# Patient Record
Sex: FEMALE | Race: AMERICAN INDIAN OR ALASKA NATIVE
[De-identification: names, ages, dates, MRNs, and addresses within clinical notes are randomized per-mention and may not be internally consistent; named-entity substitution may affect disease eponyms.]

---

## 2018-10-28 ENCOUNTER — HOSPITAL ENCOUNTER (INPATIENT)
Dept: HOSPITAL 42 - ED | Age: 63
LOS: 5 days | Discharge: HOME | DRG: 885 | End: 2018-11-02
Attending: PSYCHIATRY & NEUROLOGY | Admitting: PSYCHIATRY & NEUROLOGY
Payer: MEDICARE

## 2018-10-28 VITALS — BODY MASS INDEX: 36.9 KG/M2

## 2018-10-28 VITALS — OXYGEN SATURATION: 100 %

## 2018-10-28 DIAGNOSIS — S81.851A: ICD-10-CM

## 2018-10-28 DIAGNOSIS — F31.9: Primary | ICD-10-CM

## 2018-10-28 DIAGNOSIS — R07.89: ICD-10-CM

## 2018-10-28 DIAGNOSIS — I25.10: ICD-10-CM

## 2018-10-28 DIAGNOSIS — L03.115: ICD-10-CM

## 2018-10-28 DIAGNOSIS — I45.81: ICD-10-CM

## 2018-10-28 DIAGNOSIS — F42.9: ICD-10-CM

## 2018-10-28 DIAGNOSIS — E66.9: ICD-10-CM

## 2018-10-28 DIAGNOSIS — E78.00: ICD-10-CM

## 2018-10-28 DIAGNOSIS — S81.852A: ICD-10-CM

## 2018-10-28 DIAGNOSIS — E03.9: ICD-10-CM

## 2018-10-28 DIAGNOSIS — Z98.84: ICD-10-CM
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
## 2018-10-28 DIAGNOSIS — I10: ICD-10-CM

## 2018-10-28 DIAGNOSIS — F43.22: ICD-10-CM

## 2018-10-28 LAB
ALBUMIN SERPL-MCNC: 4.3 G/DL (ref 3–4.8)
ALBUMIN/GLOB SERPL: 1.5 {RATIO} (ref 1.1–1.8)
ALT SERPL-CCNC: 45 U/L (ref 7–56)
APAP SERPL-MCNC: < 10 UG/ML (ref 10–20)
APPEARANCE UR: CLEAR
AST SERPL-CCNC: 58 U/L (ref 14–36)
BACTERIA #/AREA URNS HPF: (no result) /[HPF]
BASOPHILS # BLD AUTO: 0.01 K/MM3 (ref 0–2)
BASOPHILS NFR BLD: 0.2 % (ref 0–3)
BILIRUB UR-MCNC: NEGATIVE MG/DL
BUN SERPL-MCNC: 18 MG/DL (ref 7–21)
CALCIUM SERPL-MCNC: 9.4 MG/DL (ref 8.4–10.5)
COLOR UR: YELLOW
EOSINOPHIL # BLD: 0.1 10*3/UL (ref 0–0.7)
EOSINOPHIL NFR BLD: 1.5 % (ref 1.5–5)
ERYTHROCYTE [DISTWIDTH] IN BLOOD BY AUTOMATED COUNT: 13.8 % (ref 11.5–14.5)
GFR NON-AFRICAN AMERICAN: 56
GLUCOSE UR STRIP-MCNC: NEGATIVE MG/DL
GRANULOCYTES # BLD: 2.35 10*3/UL (ref 1.4–6.5)
GRANULOCYTES NFR BLD: 51.5 % (ref 50–68)
HDLC SERPL-MCNC: 76 MG/DL (ref 29–60)
HGB BLD-MCNC: 13.2 G/DL (ref 12–16)
LDLC SERPL-MCNC: 61 MG/DL (ref 0–129)
LEUKOCYTE ESTERASE UR-ACNC: (no result) LEU/UL
LYMPHOCYTES # BLD: 1.9 10*3/UL (ref 1.2–3.4)
LYMPHOCYTES NFR BLD AUTO: 42.2 % (ref 22–35)
MCH RBC QN AUTO: 28 PG (ref 25–35)
MCHC RBC AUTO-ENTMCNC: 33.3 G/DL (ref 31–37)
MCV RBC AUTO: 83.9 FL (ref 80–105)
MONOCYTES # BLD AUTO: 0.2 10*3/UL (ref 0.1–0.6)
MONOCYTES NFR BLD: 4.6 % (ref 1–6)
PH UR STRIP: 6 [PH] (ref 4.7–8)
PLATELET # BLD: 191 10^3/UL (ref 120–450)
PMV BLD AUTO: 9.3 FL (ref 7–11)
PROT UR STRIP-MCNC: NEGATIVE MG/DL
RBC # BLD AUTO: 4.72 10^6/UL (ref 3.5–6.1)
RBC # UR STRIP: NEGATIVE /UL
RBC #/AREA URNS HPF: NEGATIVE /HPF (ref 0–2)
SALICYLATES SERPL-MCNC: < 1 MG/DL (ref 2–20)
SP GR UR STRIP: 1.02 (ref 1–1.03)
UROBILINOGEN UR STRIP-ACNC: 0.2 E.U./DL
WBC # BLD AUTO: 4.6 10^3/UL (ref 4.5–11)

## 2018-10-28 NOTE — RAD
Date of service: 



10/28/2018



HISTORY:

 [facility] 



COMPARISON:

06/25/2016 



FINDINGS:



LUNGS:

The lungs are well inflated and clear.



PLEURA:

No pleural effusions or pneumothorax. 



CARDIOVASCULAR:

The heart is normal in size.  No aortic atherosclerotic calcification 

present. 



OSSEOUS STRUCTURES:

Within normal limits for the patient's age.



VISUALIZED UPPER ABDOMEN:

Normal.



OTHER FINDINGS:

None.



IMPRESSION:

No active pulmonary disease.

## 2018-10-28 NOTE — ED PDOC
Physical Exam


Vital Signs Reviewed: Yes





Vital Signs











  Temp Pulse Resp BP Pulse Ox


 


 10/28/18 17:30   90  18  120/80  99


 


 10/28/18 15:55  97.7 F  104 H  18  116/74  96











Temperature: Afebrile


Blood Pressure: Normal


Pulse: Tachycardic


Respiratory Rate: Normal


Appearance: Positive for: Well-Appearing, Non-Toxic, Comfortable


Pain Distress: None


Mental Status: Positive for: Alert and Oriented X 3





- Systems Exam


Head: Present: Atraumatic, Normocephalic


Pupils: Present: PERRL


Extroacular Muscles: Present: EOMI


Conjunctiva: Present: Normal


Mouth: Present: Moist Mucous Membranes


Neck: Present: Normal Range of Motion


Respiratory/Chest: Present: Clear to Auscultation, Good Air Exchange.  No: 

Respiratory Distress, Accessory Muscle Use


Cardiovascular: Present: Regular Rate and Rhythm, Normal S1, S2.  No: Murmurs


Abdomen: No: Tenderness, Distention, Peritoneal Signs


Back: Present: Normal Inspection


Upper Extremity: Present: Normal Inspection.  No: Cyanosis, Edema


Lower Extremity: Present: Normal Inspection.  No: Edema


Neurological: Present: GCS=15, CN II-XII Intact, Speech Normal


Skin: Present: Warm, Dry, Normal Color.  No: Rashes


Psychiatric: Present: Alert, Oriented x 3, Normal Insight, Normal Concentration,

Anxious (mildly anxious), Depressed Mood





Medical Decision Making


ED Course and Treatment: 


10/28/18 19:02


Case endorsed to me by Dr. Sullivan, pending urine and hospital admission.


10/28/18 19:13


Spoke to PES evaluator who states patient will be admitted under Dr. James and 

needs to confirm insurance. Urine pending. 








- Lab Interpretations


Lab Results: 











                                 10/28/18 16:45 





                                 10/28/18 16:45 





                                   Lab Results





10/28/18 16:45: Alcohol, Quantitative < 10


10/28/18 16:45: Salicylates < 1 L, Acetaminophen < 10.0 L


10/28/18 16:45: Sodium 140, Potassium 3.7, Chloride 104, Carbon Dioxide 26, 

Anion Gap 14, BUN 18, Creatinine 1.0, Est GFR (African Amer) > 60, Est GFR (Non-

Af Amer) 56, Random Glucose 107, Calcium 9.4, Magnesium 1.9, Total Bilirubin 

0.4, AST 58 H, ALT 45, Alkaline Phosphatase 73, Total Protein 7.3, Albumin 4.3, 

Globulin 2.9, Albumin/Globulin Ratio 1.5


10/28/18 16:45: WBC 4.6  D, RBC 4.72, Hgb 13.2, Hct 39.6, MCV 83.9, MCH 28.0, 

MCHC 33.3, RDW 13.8, Plt Count 191, MPV 9.3, Gran % 51.5, Lymph % (Auto) 42.2 H,

Mono % (Auto) 4.6, Eos % (Auto) 1.5, Baso % (Auto) 0.2, Gran # 2.35, Lymph # 

(Auto) 1.9, Mono # (Auto) 0.2, Eos # (Auto) 0.1, Baso # (Auto) 0.01











- RAD Interpretation


Radiology Orders: 











10/28/18 16:03


CHEST PORTABLE [RAD] Stat 














- Scribe Statement


The provider has reviewed the documentation as recorded by the Scribe


Rahel Brown





All medical record entries made by the Scribe were at my direction and 

personally dictated by me. I have reviewed the chart and agree that the record 

accurately reflects my personal performance of the history, physical exam, 

medical decision making, and the department course for this patient. I have also

 personally directed, reviewed, and agree with the discharge instructions and 

disposition.








Disposition/Present on Arrival





- Present on Arrival


Any Indicators Present on Arrival: No


History of DVT/PE: No


History of Uncontrolled Diabetes: No


Urinary Catheter: No


History of Decub. Ulcer: No


History Surgical Site Infection Following: None





- Disposition


Have Diagnosis and Disposition been Completed?: Yes


Diagnosis: 


 Depression





Disposition Time: 19:10


Patient Plan: Admission


Condition: STABLE


Referrals: 


Ramirez Stroud MD [Primary Care Provider] - Follow up with primary


Forms:  [employer] (English)
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
## 2018-10-29 LAB
HDLC SERPL-MCNC: 77 MG/DL (ref 29–60)
LDLC SERPL-MCNC: 65 MG/DL (ref 0–129)

## 2018-10-29 RX ADMIN — AMOXICILLIN AND CLAVULANATE POTASSIUM SCH TAB: 500; 125 TABLET, FILM COATED ORAL at 15:18

## 2018-10-29 RX ADMIN — AMOXICILLIN AND CLAVULANATE POTASSIUM SCH TAB: 500; 125 TABLET, FILM COATED ORAL at 11:11

## 2018-10-29 RX ADMIN — MUPIROCIN SCH APPLIC: 20 OINTMENT TOPICAL at 11:11

## 2018-10-29 RX ADMIN — OXYCODONE HYDROCHLORIDE AND ACETAMINOPHEN PRN TAB: 2.5; 325 TABLET ORAL at 15:18

## 2018-10-29 RX ADMIN — MUPIROCIN SCH APPLIC: 20 OINTMENT TOPICAL at 15:33
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
## 2018-10-29 NOTE — CARD
--------------- APPROVED REPORT --------------





Date of service: 10/29/2018



EKG Measurement

Heart [id number]

CT [id number]

[id number]

[id number]

[id number]



<Conclusion>

Normal sinus rhythm

Low voltage QRS

Possible Inferior infarct, age undetermined

Abnormal ECG

## 2018-10-29 NOTE — CARD
--------------- APPROVED REPORT --------------





Date of service: 10/28/2018



EKG Measurement

Heart [id number]

MT [id number]

[id number]

[id number]



<Conclusion>

Sinus rhythm with 1st degree AV block

Possible Inferior infarct, old

NSSTW chnages

Prolonged QT

## 2018-10-29 NOTE — PCM.BM
<Toby Briones O - Last Filed: 10/29/18 05:40>





Treatment Plan Problems





- Problems identified on initial assessmt


  ** Hopelessness


Date Initiated: 10/28/18


Time Initiated: 22:45


Assessment reference: NA


Status: Active





  ** Altered sleep pattern


Date Initiated: 10/28/18


Time Initiated: 22:00


Assessment reference: NA


Status: Active





  ** Feeling of worthlesness


Date Initiated: 10/28/18


Time Initiated: 23:00


Assessment reference: NA


Status: Active





Treatment assets and liabiliti


Patient Assests: ADL independent, good support system, good past tx response


Patient Liabilities: poor support system





- Milieu Protocol


Maintain good personal hygiene: daily Encourage regular showers, daily Remind 

patient to perform daily oral care, daily Assist patient to perform ADL's


Maintain personal safety: daily Educate patient to report safety concerns to 

staff, daily Monitor environment for contraband/sharps


Medication safety: Monitor for expected outcome, potential side effects: daily, 

Assess barriers to learning: daily, Assess readiness for medication education: 

daily





Family Contact


Family contact: Patient declines to allow family contact at present





Discharge/Continuing Care





- Education Needs


Education Needs: Patient Medication, Patient Coping Skills, Patient Aftercare 

Safety Plan





- Discharge


Discharge Criteria: Normal sleep pattern





<Erika Ghotra - Last Filed: 10/29/18 15:00>





- Diagnosis


(1) Bipolar disorder


Status: Acute   


Interventions: 





10/29/18 15:01


Psychoeducation


Psychopharmacology/adjustment of medications as needed/ monitoring possible side

effects


Monitor blood level of mood stabilizers


Evaluate pt on daily basis


Compliance with medications and follow up appointments


Suicide and homicide risk assessment and prevention, coping strategies, safety 

plan


Relapse prevention


Reduction of symptoms


Improve functional status


Family involvement


As outpatient: cognitive behavioral therapy








(2) History of OCD (obsessive compulsive disorder)


Status: Acute   


Interventions: 





10/29/18 15:01


Psychoeducation


Psychopharmacology/adjustment of medications as needed/ monitoring possible side

effects


Evaluate pt on daily basis


Compliance with medications and follow up appointments


Suicide and homicide risk assessment and prevention, coping strategies, safety 

plan


Relapse prevention


Reduction of symptoms


Improve functional status


Family involvement


CBT, SSRI, exposure response prevention as outpatient


Supportive therapy








<Lucita Panchal Y - Last Filed: 10/31/18 15:16>





Family Contact


Family contact: Patient declines to allow family contact at present





- Outside Agency


  ** Dr. Bunch


Care involvment: Information-sharing


Agency contact name: psychiatrist, Dr. Bunch

## 2018-10-29 NOTE — CON
DATE OF CONSULTATION:  10/29/2018



REASON FOR CONSULTATION:  Abnormal EKG.



HISTORY OF PRESENT ILLNESS:  The patient is a 63-year-old female who has a

history of depression, was admitted because of symptoms of depression, not

associated with suicidal ideation.  The patient also did report chest

discomfort to me today.  Her EKG was abnormal with evidence of prolonged AK

interval and prolonged QT interval.  The patient denies any dizziness or

syncope.  The patient stated that she underwent recently, on 10/03/2018, an

echocardiogram as well as a Lexiscan.  The Lexiscan was reported to be

negative as per my own review of the report and the echocardiograph study

revealed normal ejection fraction and mild pulmonary hypertension.  The

patient also stated she underwent cardiac catheterization in 06/2016, which

I did review the report, which revealed normal coronary circulation and

normal left ventricular diastolic function.



PAST MEDICAL HISTORY:  Insignificant except for depression.



SOCIAL HISTORY:  Nonsmoker.



CURRENT MEDICATIONS:  Augmentin 1 tablet p.o. twice a day, Desyrel 100 mg

at bedtime daily, Trileptal 450 mg once a day, Xanax 1 mg p.o. every 8

hours.



REVIEW OF SYSTEMS:  No nausea or vomiting.  No fever or chills.



PHYSICAL EXAMINATION:

GENERAL:  The patient is a middle-aged female who does not appear to be in

any distress.

VITAL SIGNS:  Blood pressure 101/53, heart rate 66, temperature 97.7,

respirations 19.

HEENT:  Normocephalic.

NECK:  No JVD.

CHEST:  Clear.

HEART:  S1 and S2 regular.

ABDOMEN:  Soft.

EXTREMITIES:  No edema.



LABORATORY DATA:  A urine drug screen is positive for methadone and

benzodiazepines.  SMA-7 is within normal limits.  TSH level is below normal

at 0.45.  Hemoglobin and hematocrit 15.1 and 39.6, white count and platelet

count are within normal limits.  Second EKG done yesterday revealed sinus

rhythm with first-degree AV block and nonspecific ST-T wave changes. 

Inferior Q-waves were noted.



ASSESSMENT:

1.  Atypical chest discomfort.  The patient did have recently normal

Lexiscan and normal echocardiograph study.  Both were performed on

10/03/2018 as an outpatient.

2.  Abnormal EKG with a slightly prolonged QT and AK interval.

3.  Depression.



RECOMMENDATIONS:  Continue current antidepressants.  I will obtain serum

D-dimer, and if elevated, I will proceed with chest CT angio and venous

Doppler of the lower extremities.





__________________________________________

Eleno Quiroz MD





DD:  10/29/2018 13:01:40

DT:  10/29/2018 13:04:40

Job # [id number]

## 2018-10-30 VITALS — RESPIRATION RATE: 20 BRPM

## 2018-10-30 LAB
ALBUMIN SERPL-MCNC: 4.1 G/DL (ref 3–4.8)
ALBUMIN/GLOB SERPL: 1.3 {RATIO} (ref 1.1–1.8)
ALT SERPL-CCNC: 50 U/L (ref 7–56)
AST SERPL-CCNC: 42 U/L (ref 14–36)
BUN SERPL-MCNC: 14 MG/DL (ref 7–21)
CALCIUM SERPL-MCNC: 9 MG/DL (ref 8.4–10.5)
ERYTHROCYTE [DISTWIDTH] IN BLOOD BY AUTOMATED COUNT: 13.9 % (ref 11.5–14.5)
GFR NON-AFRICAN AMERICAN: > 60
HGB BLD-MCNC: 12.4 G/DL (ref 12–16)
MCH RBC QN AUTO: 27.6 PG (ref 25–35)
MCHC RBC AUTO-ENTMCNC: 33.1 G/DL (ref 31–37)
MCV RBC AUTO: 83.5 FL (ref 80–105)
PLATELET # BLD: 187 10^3/UL (ref 120–450)
PMV BLD AUTO: 9.5 FL (ref 7–11)
RBC # BLD AUTO: 4.49 10^6/UL (ref 3.5–6.1)
WBC # BLD AUTO: 3.2 10^3/UL (ref 4.5–11)

## 2018-10-30 RX ADMIN — OXYCODONE HYDROCHLORIDE AND ACETAMINOPHEN PRN TAB: 2.5; 325 TABLET ORAL at 09:17

## 2018-10-30 RX ADMIN — AMOXICILLIN AND CLAVULANATE POTASSIUM SCH TAB: 500; 125 TABLET, FILM COATED ORAL at 09:15

## 2018-10-30 RX ADMIN — AMOXICILLIN AND CLAVULANATE POTASSIUM SCH TAB: 500; 125 TABLET, FILM COATED ORAL at 17:19

## 2018-10-30 RX ADMIN — PANTOPRAZOLE SODIUM SCH MG: 40 TABLET, DELAYED RELEASE ORAL at 06:17

## 2018-10-30 RX ADMIN — OXYCODONE HYDROCHLORIDE AND ACETAMINOPHEN PRN TAB: 2.5; 325 TABLET ORAL at 22:11

## 2018-10-30 RX ADMIN — PANTOPRAZOLE SODIUM SCH MG: 40 TABLET, DELAYED RELEASE ORAL at 06:11

## 2018-10-30 RX ADMIN — MUPIROCIN SCH APPLIC: 20 OINTMENT TOPICAL at 09:20

## 2018-10-30 RX ADMIN — MUPIROCIN SCH APPLIC: 20 OINTMENT TOPICAL at 17:20

## 2018-10-30 NOTE — PCM.PYCHPN
Psychiatric Progress Note





- Psychiatric Progress Note


Patient seen today, length of contact: 30 minutes


Patient Chief Complaint: 





"I am very emotional, sentimental..., i was not able to fall asleep and stay 

asleep"


Problems Identified/Issues Discussed: 


Suicide/ homicide prevention, past psychiatric h/o, current psychiatric 

symptoms, medical problems, risk/benefits and alternatives of medications, 

medications compliance, coping strategies, substance abuse h/o, relapse pre

vention, importance of follow up with psychiatrist and therapist, discharge 

plan. 





Medical Problems: 


please see medical team note for more detailed information





Diagnostic Results: 














                                 10/30/18 08:00 





                                 10/30/18 08:00 





                                   Lab Results





10/30/18 08:00: TSH 3rd Generation 0.28 L


10/30/18 08:00: Sodium 140, Potassium 3.7, Chloride 106, Carbon Dioxide 23, A

nion Gap 14, BUN 14, Creatinine 0.8, Est GFR (African Amer) > 60, Est GFR (Non-

Af Amer) > 60, Random Glucose 91, Calcium 9.0, Total Bilirubin 0.3, AST 42 H D, 

ALT 50, Alkaline Phosphatase 63, Total Protein 7.2, Albumin 4.1, Globulin 3.1, 

Albumin/Globulin Ratio 1.3


10/30/18 08:00: WBC 3.2 L D, RBC 4.49, Hgb 12.4, Hct 37.5, MCV 83.5, MCH 27.6, 

MCHC 33.1, RDW 13.9, Plt Count 187, MPV 9.5


10/29/18 14:00: D-Dimer, Quantitative 285 H


10/29/18 07:00: RPR Nonreactive


10/29/18 07:00: Fasting Glucose 93, Triglycerides 121, Cholesterol 157, LDL 

Cholesterol Direct 65, HDL Cholesterol 77 H


10/29/18 07:00: TSH 3rd Generation 0.45 L


10/28/18 22:15: Hemoglobin A1c 5.6


10/28/18 22:15: Triglycerides 157, Cholesterol 162, LDL Cholesterol Direct 61, 

HDL Cholesterol 76 H


10/28/18 19:25: Urine Opiates Screen Positive H, Urine Methadone Screen 

Negative, Ur Barbiturates Screen Negative, Ur Phencyclidine Scrn Negative, Ur 

Amphetamines Screen Negative, U Benzodiazepines Scrn Positive H, U Oth Cocaine 

Metabols Negative, U Cannabinoids Screen Negative


10/28/18 19:25: Urine Color Yellow, Urine Appearance Clear, Urine pH 6.0, Ur 

Specific Gravity 1.020, Urine Protein Negative, Urine Glucose (UA) Negative, 

Urine Ketones Negative, Urine Blood Negative, Urine Nitrate Negative, Urine 

Bilirubin Negative, Urine Urobilinogen 0.2, Ur Leukocyte Esterase Trace H, Urine

RBC Negative, Urine WBC 1 - 3, Ur Epithelial Cells 1 - 3, Urine Bacteria Few


10/28/18 16:45: Alcohol, Quantitative < 10


10/28/18 16:45: Salicylates < 1 L, Acetaminophen < 10.0 L


10/28/18 16:45: Sodium 140, Potassium 3.7, Chloride 104, Carbon Dioxide 26, 

Anion Gap 14, BUN 18, Creatinine 1.0, Est GFR (African Amer) > 60, Est GFR (Non-

Af Amer) 56, Random Glucose 107, Calcium 9.4, Magnesium 1.9, Total Bilirubin 

0.4, AST 58 H, ALT 45, Alkaline Phosphatase 73, Total Protein 7.3, Albumin 4.3, 

Globulin 2.9, Albumin/Globulin Ratio 1.5


10/28/18 16:45: WBC 4.6  D, RBC 4.72, Hgb 13.2, Hct 39.6, MCV 83.9, MCH 28.0, 

MCHC 33.3, RDW 13.8, Plt Count 191, MPV 9.3, Gran % 51.5, Lymph % (Auto) 42.2 H,

Mono % (Auto) 4.6, Eos % (Auto) 1.5, Baso % (Auto) 0.2, Gran # 2.35, Lymph # 

(Auto) 1.9, Mono # (Auto) 0.2, Eos # (Auto) 0.1, Baso # (Auto) 0.01








Vital Signs











  Temp Pulse Resp BP Pulse Ox


 


 10/30/18 09:16   97 H   120/86 


 


 10/30/18 07:24  97.4 F L  97 H  20  120/86 


 


 10/29/18 15:18   101 H   153/93 H 


 


 10/29/18 14:52   66   101/53 L 


 


 10/29/18 07:00  97.7 F  66  19  101/53 L 


 


 10/29/18 04:40    19  


 


 10/28/18 19:31  98.2 F  88  16  128/72  100


 


 10/28/18 17:30   90  18  120/80  99


 


 10/28/18 15:55  97.7 F  104 H  18  116/74  96











DSM 5 Symptoms Update: 


shortly pt is 62yo female with h/o bipolar disorder, at least one previous psych

admission to this facility in 2015, pt is following up in the community by 

, pt reported being compliant with meds and f/u appt, multiple 

medical issues, was sent to the psych unit by outpatient psychiatrist for 

evaluation of worsening of depression/ocd/ pt tried to jump off the moving 

vehicle over the weekend. pt requires further evaluation and stabilization, meds

adjustment. 





pt was seen today next to the nursing station, patient observed to be emotional 

labile crying, patient said that she feels sentimental, patient reported that 

she was not able to sleep asked to increase trazodone at the nighttime.





presented to have acceptable personal hygiene, was calm/cooperative/ tearful 

when was asked about her depressive symptoms. 


pt has good ADLs. 





So far patient tolerates adjustment of the medications well, patient asked xanax

XL to be given as a home med





aims 0, no EPS.





impression:


As per history bipolar disorder


Medication Change: Yes (xanax XL)


Medical Record Reviewed: Yes


Consults ordered or reviewed: 





medical consult appreciated please see notes for more detailed information





Mental Status Examination





- Cognitive Function


Orientation: Person, Place, Situation, Time


Memory: Intact


Attention: Poor


Concentration: Poor


Association: Loose


Fund of Knowledge: WNL





- Mood


Mood: Depressed, Anxious





- Affect


Affect: Flat (and tearful)





- Formal Thought Process


Formal Thought Process: No Impairment, Other (mildy disorganized)





- Suicidal Ideation


Suicidal Ideation: No





- Homicidal Ideation


Homicidal Ideation: No





Goal/Treatment Plan





- Goal/Treatment Plan


Need for Continued Stay: Remain at risks for inpatient hospitalization, Severe 

depression anxiety, Discharge may exacerbated symptoms, Severe functional 

impairment


Progress Toward Problem(s) and Goals/Treatment Plan: 


Milieu/structure/supportive therapy 


Medical consult was called


cardiology consult initiated


meds resumed, med list filed into the chart


celexa d/c


prozac 10 mg started for depression and anxiety


risperdal 1mg po bid formal stabilization


Trileptal 153 times a day for mood stabilization


SW consultation for discharge plan and social issues 


discussed with  0/29/2018


Family involvement 


Follow up on labs 


Will monitor closely 


Pt was educated about risk/benefits and alternatives of medications, coping 

strategies (safety plan, suicide prevention), relapse prevention, importance of 

follow up with psychiatrist and therapist, stay away from drugs/alcohol/smoking





Estimated Date of D/C: 11/06/18

## 2018-10-30 NOTE — PN
DATE:



SUBJECTIVE:  I saw her in the psychiatric floor.  She is eating her

breakfast, does not like the food, but she is eating it.  She is feeling a

little bit better mentally.  She is having this right should pain.  She

wants Physical Therapy to see her, I will order it and see what they can do

for her.  She is on Augmentin and Bactroban cream for a dog bite, which I

think is going to get better.  Cozaar, Desyrel, Geodon,

hydrochlorothiazide, Protonix, Prozac, Risperdal, Synthroid, oxcarbazepine,

Tylenol, Xanax and it looks like she is getting some oxycodone.



PHYSICAL EXAMINATION:

VITAL SIGNS:  Temperature is 97.4 , 97 pulse, 120/86 blood pressure, and 20

respiratory rate.

HEENT:  Head is atraumatic and normocephalic.

HEART:  Regular rate.

LUNGS:  Clear to auscultation.

ABDOMEN:  Soft, obese and nontender.

EXTREMITIES:  No edema, but she is having a left calf dog bite, on

Bactroban cream with some antibiotics.  She tells me it is going to be a

little bit better and I will get some physical therapy for her right

shoulder.  Continue with aggressive treatment and care as per Psychiatry.



LABORATORY DATA:  She had blood test, 3.2 white count, 12.4 hemoglobin,

37.5 hematocrit and  platelets.  She has 140 sodium, potassium 3.7,

BUN 14, creatinine 0.8, GFR is greater than 60, sugar is 91, calcium is 9,

and total bili is 0.3.  AST is 42, ALT is 50 and alk phos 53.  TSH is 0.28.

She is not on any Synthroid.



ASSESSMENT AND PLAN:  She is breathing well.  No chest pain or shortness of

breath.  I am going to continue with aggressive treatment and care on Valerie Acosta.  There was a consult for Cardiology on elevated QTC.  Continue

with current antidepressants.  Will need a CT angio because the D-dimer is

elevated.  Venous Doppler of the lower extremities.







__________________________________________

Karel Liao DO





DD:  10/30/2018 9:40:19

DT:  10/30/2018 13:14:35

Job # [id number]

JENNIFER

## 2018-10-30 NOTE — PN
DATE:  10/30/2018



SUBJECTIVE:  The patient denies any chest pain or shortness of breath.



PHYSICAL EXAMINATION

VITAL SIGNS:  Blood pressure 120/86, heart rate 97, temperature 97.4,

respirations 20.

HEENT:  Normocephalic.

CHEST:  Clear.

HEART:  S1 and S2 regular.

ABDOMEN:  Soft.

EXTREMITIES:  No edema.



LABORATORY DATA:  The SMA-7 is within normal limits.  Today's white count

8.2, and the rest of CBC is within normal limits.  Venous Doppler of lower

extremities, no sonographic evidence of DVT.



ASSESSMENT:

1.  Atypical chest pain, myocardial infarction ruled out.

2.  Rule out pulmonary infarction.

3.  Depression.



RECOMMENDATIONS:  Continue current Synthroid, Prozac, oral Protonix,

hydrochlorothiazide, and Trileptal.  The patient is scheduled for

ventilation/perfusion scan as she is ALLERGIC TO DYE CONTRAST.







__________________________________________

Eleno Quiroz MD





DD:  10/30/2018 16:03:11

DT:  10/30/2018 19:27:18

Job # [id number]

## 2018-10-30 NOTE — US
HISTORY:

Leg pain and swelling. Evaluate for DVT



PHYSICIAN(S):  Tao Ruvalcaba MD.



TECHNIQUE:

Duplex sonography and color-flow Doppler with graded compression were 

used to evaluate the deep venous systems of both lower extremities. 



FINDINGS:

The visualized deep venous systems of both lower extremities are 

sonographically normal and compressible. Normal wave forms and 

augmentation are seen. There is no sonographic evidence for deep 

venous thrombosis in the visualized segments of both lower 

extremities.



IMPRESSION:

No sonographic evidence for deep venous thrombosis in the visualized 

segments of both lower extremities.

## 2018-10-31 RX ADMIN — OXYCODONE HYDROCHLORIDE AND ACETAMINOPHEN PRN TAB: 2.5; 325 TABLET ORAL at 22:19

## 2018-10-31 RX ADMIN — AMOXICILLIN AND CLAVULANATE POTASSIUM SCH TAB: 500; 125 TABLET, FILM COATED ORAL at 15:03

## 2018-10-31 RX ADMIN — PANTOPRAZOLE SODIUM SCH MG: 40 TABLET, DELAYED RELEASE ORAL at 06:32

## 2018-10-31 RX ADMIN — AMOXICILLIN AND CLAVULANATE POTASSIUM SCH TAB: 500; 125 TABLET, FILM COATED ORAL at 08:03

## 2018-10-31 RX ADMIN — MUPIROCIN SCH APPLIC: 20 OINTMENT TOPICAL at 08:02

## 2018-10-31 RX ADMIN — MUPIROCIN SCH APPLIC: 20 OINTMENT TOPICAL at 15:03

## 2018-10-31 NOTE — PN
DATE:  10/31/2018



FOLLOWUP



SUBJECTIVE:  The patient denies any chest pain or shortness of breath.



PHYSICAL EXAMINATION:

VITAL SIGNS:  Blood pressure 135/85, heart rate 102, temperature 98.5, and

respirations 20.

HEENT:  Normocephalic.

CHEST:  Clear.

HEART:  S1 and S2 are regular.

EXTREMITIES:  Cellulitis involving the posterior aspect of the right lower

leg.  The patient attributed that to recent dog bite.



ASSESSMENT:

1.  Chest pain, myocardial infraction is ruled out.

2.  Right calf cellulitis.

3.  Depression.



RECOMMENDATIONS:  Continue current oral amoxycillin, which was ordered

today.  I will follow ventilation/perfusion scan results.





__________________________________________

Eleno Quiroz MD





DD:  10/31/2018 10:47:07

DT:  10/31/2018 21:21:55

Job # [id number]

## 2018-10-31 NOTE — CON
DATE:  10/31/2018



The patient is in bed.  I seen her in psychiatric floor.



CHIEF COMPLAINT:  Dog bite x3 weeks ago.



HISTORY OF PRESENT ILLNESS:  This is a 63-year-old female with history of

obesity, hypertension, high cholesterol, depression, and anxiety.  She

states she does not have diabetes.  Nursing assessment writes that the

patient is diabetic.   She is not on any diabetic medications.  She is

admitted to the psychiatric floor with diagnosis of depression and on the

right leg she had dog bite, she still has tenderness there.  Infectious

Disease consultation is requested.



REVIEW OF SYSTEMS:  A 14-point review of system is performed.  No fevers. 

No chills.  No nausea.  No vomiting.  She is having pain at the right leg. 

No chest pain, shortness of breath.  No headaches or blurred vision.  No

neurological symptoms.



PAST MEDICAL HISTORY:  Significant for anxiety, depression, high

cholesterol, hypertension, and obesity with a BMI of 39.



PAST SURGICAL HISTORY:  Significant for lap banding four years ago.



ALLERGIES:  PATIENT IS ALLERGIC TO ADHESIVE TAPE, IODINE, IODINE CONTAINING

PRODUCTS, LEVOTHYROXINE, POVIDONE IODINE AND SOAPS.  SHE DENIES ANY

ALLERGIES, ALTHOUGH THIS IS LISTED ON THE CHART.



MEDICATIONS AT HOME:  Include levothyroxine.



PHYSICAL EXAMINATION

VITAL SIGNS:  On exam, she is in bed with a temperature of 98, heart rate

of 101, respiratory rate 20, blood pressure 120/80.

HEENT:  Examination of HEENT is unremarkable.

NECK:  Supple.

LUNGS:  Decreased breath sounds.

HEART:  Normal S1 and S2.

ABDOMEN:  Soft and nontender.  No rebound or guarding.

EXTREMITIES:  Examination of right leg, there is tenderness in fluctuant

area and there is no discharge.  It is indurated.



LABORATORY EXAMINATION:  Reveals a white count of 4.6. hemoglobin 13, and

platelets of 197.  Chemistries are noted to be normal.  Urinalysis is

reviewed.  Toxicology is reviewed.  Serology, RPR is negative. 

Microbiology is reviewed.  Urine cultures, no growth.



ASSESSMENT:  This is a 63-year-old female with obesity with body mass index

of 39, hypertension, high cholesterol, depression and anxiety with right

leg dog bite, she is not sure whose dog it was and she was treated in the

emergency room appropriately at that time, must rule out underlying

abscess.  We recommended a surgical consultation.  Currently, on p.o.

Augmentin, will complete the p.o. Augmentin and consider imaging to rule

out an abscess at the site and the fluctuant, may need incision and

drainage by Surgery.  Case discussed with Dr. Karel Liao.







__________________________________________

Kvng Blackwood MD





DD:  10/31/2018 11:03:35

DT:  10/31/2018 20:19:22

Job # [id number]

## 2018-10-31 NOTE — PCM.PYCHPN
Psychiatric Progress Note





- Psychiatric Progress Note


Patient seen today, length of contact: 30 minutes


Patient Chief Complaint: 





"I feel little better"


Problems Identified/Issues Discussed: 


Suicide/ homicide prevention, past psychiatric h/o, current psychiatric 

symptoms, medical problems, risk/benefits and alternatives of medications, 

medications compliance, coping strategies, substance abuse h/o, relapse 

prevention, importance of follow up with psychiatrist and therapist, discharge 

plan. 





Medical Problems: 


please see medical team note for more detailed information





Diagnostic Results: 














                                 10/30/18 08:00 





                                 10/30/18 08:00 





                                   Lab Results





10/30/18 08:00: TSH 3rd Generation 0.28 L


10/30/18 08:00: Sodium 140, Potassium 3.7, Chloride 106, Carbon Dioxide 23, 

Anion Gap 14, BUN 14, Creatinine 0.8, Est GFR (African Amer) > 60, Est GFR (Non-

Af Amer) > 60, Random Glucose 91, Calcium 9.0, Total Bilirubin 0.3, AST 42 H D, 

ALT 50, Alkaline Phosphatase 63, Total Protein 7.2, Albumin 4.1, Globulin 3.1, 

Albumin/Globulin Ratio 1.3


10/30/18 08:00: WBC 3.2 L D, RBC 4.49, Hgb 12.4, Hct 37.5, MCV 83.5, MCH 27.6, 

MCHC 33.1, RDW 13.9, Plt Count 187, MPV 9.5


10/29/18 14:00: D-Dimer, Quantitative 285 H


10/29/18 07:00: RPR Nonreactive


10/29/18 07:00: Fasting Glucose 93, Triglycerides 121, Cholesterol 157, LDL 

Cholesterol Direct 65, HDL Cholesterol 77 H


10/29/18 07:00: TSH 3rd Generation 0.45 L


10/28/18 22:15: Hemoglobin A1c 5.6


10/28/18 22:15: Triglycerides 157, Cholesterol 162, LDL Cholesterol Direct 61, 

HDL Cholesterol 76 H


10/28/18 19:25: Urine Opiates Screen Positive H, Urine Methadone Screen 

Negative, Ur Barbiturates Screen Negative, Ur Phencyclidine Scrn Negative, Ur 

Amphetamines Screen Negative, U Benzodiazepines Scrn Positive H, U Oth Cocaine 

Metabols Negative, U Cannabinoids Screen Negative


10/28/18 19:25: Urine Color Yellow, Urine Appearance Clear, Urine pH 6.0, Ur 

Specific Gravity 1.020, Urine Protein Negative, Urine Glucose (UA) Negative, 

Urine Ketones Negative, Urine Blood Negative, Urine Nitrate Negative, Urine 

Bilirubin Negative, Urine Urobilinogen 0.2, Ur Leukocyte Esterase Trace H, Urine

RBC Negative, Urine WBC 1 - 3, Ur Epithelial Cells 1 - 3, Urine Bacteria Few


10/28/18 16:45: Alcohol, Quantitative < 10


10/28/18 16:45: Salicylates < 1 L, Acetaminophen < 10.0 L


10/28/18 16:45: Sodium 140, Potassium 3.7, Chloride 104, Carbon Dioxide 26, 

Anion Gap 14, BUN 18, Creatinine 1.0, Est GFR (African Amer) > 60, Est GFR (Non-

Af Amer) 56, Random Glucose 107, Calcium 9.4, Magnesium 1.9, Total Bilirubin 

0.4, AST 58 H, ALT 45, Alkaline Phosphatase 73, Total Protein 7.3, Albumin 4.3, 

Globulin 2.9, Albumin/Globulin Ratio 1.5


10/28/18 16:45: WBC 4.6  D, RBC 4.72, Hgb 13.2, Hct 39.6, MCV 83.9, MCH 28.0, 

MCHC 33.3, RDW 13.8, Plt Count 191, MPV 9.3, Gran % 51.5, Lymph % (Auto) 42.2 H,

Mono % (Auto) 4.6, Eos % (Auto) 1.5, Baso % (Auto) 0.2, Gran # 2.35, Lymph # 

(Auto) 1.9, Mono # (Auto) 0.2, Eos # (Auto) 0.1, Baso # (Auto) 0.01








Vital Signs











  Temp Pulse Resp BP Pulse Ox


 


 10/30/18 09:16   97 H   120/86 


 


 10/30/18 07:24  97.4 F L  97 H  20  120/86 


 


 10/29/18 15:18   101 H   153/93 H 


 


 10/29/18 14:52   66   101/53 L 


 


 10/29/18 07:00  97.7 F  66  19  101/53 L 


 


 10/29/18 04:40    19  


 


 10/28/18 19:31  98.2 F  88  16  128/72  100


 


 10/28/18 17:30   90  18  120/80  99


 


 10/28/18 15:55  97.7 F  104 H  18  116/74  96











DSM 5 Symptoms Update: 


shortly pt is 62yo female with h/o bipolar disorder, at least one previous psych

admission to this facility in 2015, pt is following up in the community by 

, pt reported being compliant with meds and f/u appt, multiple 

medical issues, was sent to the psych unit by outpatient psychiatrist for 

evaluation of worsening of depression/ocd/ pt tried to jump off the moving 

vehicle over the weekend. pt requires further evaluation and stabilization, meds

adjustment. 





pt was seen today at the treatment team meeting, patient presented to be 

emotional, overall reported that she feels better, patient reported that she is 

anxious about her medical issues and possibility to be admitted to the medical 

floor for her leg infection. 





presented to have acceptable personal hygiene, was calm/cooperative/ tearful 

when was asked about her depressive symptoms. 


pt has good ADLs. 





So far patient tolerates adjustment of the medications well, prozac increased. 





aims 0, no EPS.





impression:


As per history bipolar disorder





Medication Change: Yes (prozac)


Medical Record Reviewed: Yes





Mental Status Examination





- Cognitive Function


Orientation: Person, Place, Situation, Time


Memory: Intact


Attention: Poor (some improvement)


Concentration: Poor (some improvement)


Association: Loose (some improvement)


Fund of Knowledge: WNL





- Mood


Mood: Depressed, Anxious





- Affect


Affect: Flat (and tearful)





- Formal Thought Process


Formal Thought Process: No Impairment, Other (mildy disorganized)





- Suicidal Ideation


Suicidal Ideation: No





- Homicidal Ideation


Homicidal Ideation: No





Goal/Treatment Plan





- Goal/Treatment Plan


Need for Continued Stay: Remain at risks for inpatient hospitalization, Severe 

depression anxiety, Discharge may exacerbated symptoms, Severe functional 

impairment


Progress Toward Problem(s) and Goals/Treatment Plan: 


Milieu/structure/supportive therapy 


Medical consult was called


cardiology consult initiated


meds resumed, med list filed into the chart


celexa d/c


prozac 20 mg started for depression and anxiety


risperdal 1mg po bid formal stabilization


Trileptal 153 times a day for mood stabilization


SW consultation for discharge plan and social issues 


discussed with  0/29/2018


Family involvement 


Follow up on labs 


Will monitor closely 


Pt was educated about risk/benefits and alternatives of medications, coping 

strategies (safety plan, suicide prevention), relapse prevention, importance of 

follow up with psychiatrist and therapist, stay away from drugs/alcohol/smoking





Estimated Date of D/C: 11/06/18

## 2018-10-31 NOTE — NM
Date of service: 



10/30/2018



COMPARISON:

10/28/2018.  Single-view chest



TECHNIQUE:

30.0 mCi technetium 99-m  Xe-133 Gas. 



2.75 mCI technetium 99-m MAA administered intravenously.



FINDINGS:



VENTILATION COMPONENT:

Heterogeneous ventilation. Retention of radionuclide in the 

tracheobronchial tree and ingestion of radionuclide in the stomach,  

incidental findings 



PERFUSION COMPONENT:

Heterogeneous distribution of radionuclide. No geographic, segmental, 

lobar abnormalities apparent on the present examination.



IMPRESSION:

Low probability ventilation perfusion scan for pulmonary embolism.

## 2018-10-31 NOTE — PN
DATE:  10/31/2018



SUBJECTIVE:  She was very upset this morning about the dog bite on her

right calf and she is on antibiotics, Augmentin and Bactroban cream, also

Cozaar, Desyrel, Geodon, hydrochlorothiazide, oxycodone, Protonix, Prozac,

Risperdal, levothyroxine which I will decrease, oxcarbazepine and she was

very concerned about the right calf.  I looked that with her, I thought it

may be a little bit better, but I will leave it to Dr. Blackwood,

Infectious Disease to take a look at it too.  She is eating okay, trying to

get better mentally.



PHYSICAL EXAMINATION:

VITAL SIGNS:  She has 98.5 temp, 102 pulse, 135/85 blood pressure, 20

respiratory rate.

HEENT:  Head is atraumatic, normocephalic.

HEART:  Regular rate.

LUNGS:  Decreased breath sounds, but clear.

ABDOMEN:  Soft, obese, nontender.

EXTREMITIES:  No edema.



ASSESSMENT AND PLAN:  The right calf has skin issues from the dog bite and

we will see what Dr. Blackwood thinks.  We will check her labs.  We will

get in consult.







__________________________________________

Karel Liao DO



DD:  10/31/2018 8:33:44

DT:  10/31/2018 10:40:42

Job # [id number]

## 2018-10-31 NOTE — CP.PCM.CON
History of Present Illness





- History of Present Illness


History of Present Illness: 


Sheridan Strong





63 year old male with past medical history of hypertension, CAD, hypothyroidism,

depression, suicidal ideation, and history of dog bite 3 weeks and 2 months ago 

presents at bedside status post dog bite 3 weeks after her last dog bit by her 

dog. She reports that the bite is on right calf which first felt like an aching 

pain and has gotten worse since that time. Now, the pain feel like "someone 

punching her calf". She has been on augmentin for 2 weeks for the dog bite. 

Yesterday, the erythema has worsened and some yellow discharge was noted by 

patient. She denies any radiation of the pain. She denies any chills, fever, 

fatigue, shortness of breath, chest pain, abdominal pain, nausea, vomiting, 

constipation, diarrhea, dysuria, hematuria.





PMH: hypertension, CAD, hypothyroidism, depression, suicidal ideation


PSH: gastric lap band in 2016, cardiac catherization 2016


FMHx: noncontributary


Allergies: adhesive tape, iodine, levothyroxine, soap, crabs, frush fruit


Social history: denies alcohol, illicit drug use, and smoking history





PMD: Dr. Maximus Guzmán








Review of Systems





- Constitutional


Constitutional: absent: Chills, Fever





- EENT


Eyes: absent: Blurred Vision


Ears: absent: Decreased Hearing





- Cardiovascular


Cardiovascular: absent: Chest Pain, Edema





- Respiratory


Respiratory: absent: Cough





- Gastrointestinal


Gastrointestinal: absent: Abdominal Pain, Constipation, Diarrhea, Nausea, 

Vomiting





- Genitourinary


Genitourinary: absent: Dysuria, Hematuria





- Musculoskeletal


Musculoskeletal: absent: Arthralgias





- Integumentary


Additional comments: 


induration of right calf mass. Blue, purple discoloration around the mass. 

Nonerythematous, not warm around the area.








- Neurological


Neurological: absent: Numbness, Tingling, Tremor, Weakness





Past Patient History





- Past Medical History & Family History


Past Medical History?: Yes





- Past Social History


Smoking Status: Never Smoked





- CARDIAC


Hx Hypertension: Yes





- PULMONARY


Hx Respiratory Disorders: No


Hx Tuberculosis: No





- NEUROLOGICAL


Hx Seizures: No





- HEENT


Hx HEENT Problems: No





- RENAL


Hx Chronic Kidney Disease: No





- HEMATOLOGICAL/ONCOLOGICAL


Hx Blood Disorders: No





- INTEGUMENTARY


Hx Dermatological Problems: No





- MUSCULOSKELETAL/RHEUMATOLOGICAL


Hx Musculoskeletal Disorders: Yes


Hx Falls: No


Hx Herniated Disk: Yes





- GASTROINTESTINAL


Hx Gastrointestinal Disorders: No





- GENITOURINARY/GYNECOLOGICAL


Hx Sexually Transmitted Disorders: No





- PSYCHIATRIC


Hx Anxiety: Yes


Hx Bipolar Disorder: Yes


Hx Depression: Yes


Hx Substance Use: No





- SURGICAL HISTORY


Other/Comment: Gastric lap band 2016.  Cardiac Catherization 2016 [facility]

spital





- ANESTHESIA


Hx Anesthesia: Yes


Hx Anesthesia Reactions: No


Hx Malignant Hyperthermia: No





Meds


Allergies/Adverse Reactions: 


                                    Allergies











Allergy/AdvReac Type Severity Reaction Status Date / Time


 


adhesive tape Allergy  RASH Verified 10/28/18 22:23


 


Iodine and Iodide Containing Allergy  ANAPHYLAXIS Verified 10/28/18 22:23





Produc     


 


levothyroxine sodium Allergy  REDNESS Verified 10/28/18 22:23





[From Synthroid]     


 


povidone-iodine Allergy  RASH Verified 10/28/18 22:23





[From Betadine]     


 


soap [From Betadine] Allergy  RASH Verified 10/28/18 22:23


 


CRABS Allergy Severe ANAPHYLAXIS Uncoded 10/28/18 22:23


 


fresh fruit Allergy Intermediate ITCHING Uncoded 10/28/18 22:23














- Medications


Medications: 


                               Current Medications





Acetaminophen (Tylenol 325mg Tab)  325 mg PO Q6H PRN


   PRN Reason: Pain, Mild (1-3)


   Last Admin: 10/31/18 15:02 Dose:  325 mg


Al Hydrox/Mg Hydrox/Simethicone (Maalox Plus 30 Ml)  30 ml PO DAILY PRN


   PRN Reason: Dyspepsia


   Last Admin: 10/29/18 23:49 Dose:  30 ml


Amoxicillin/Clavulanate Potassium (Augmentin 500 Mg-125 Mg Tab)  1 tab PO BID 

[facility]; Protocol


   Last Admin: 10/31/18 15:03 Dose:  1 tab


Fluoxetine HCl (Prozac)  20 mg PO DAILY [facility]


Home Med (Home Med)  0 unit PO Q8 PRN


   PRN Reason: Anxiety


   Last Admin: 10/30/18 22:10 Dose:  1 unit


Hydrochlorothiazide (Microzide)  12.5 mg PO DAILY [facility]


   Last Admin: 10/31/18 08:05 Dose:  12.5 mg


Levothyroxine Sodium (Synthroid)  88 mcg PO 0600 [facility]


Losartan Potassium (Cozaar)  50 mg PO DAILY [facility]


   Last Admin: 10/31/18 08:04 Dose:  50 mg


Magnesium Hydroxide (Milk Of Magnesia)  30 ml PO DAILY PRN


   PRN Reason: Constipation


Mupirocin (Bactroban Ointment)  0 gm TOP BID [facility]


   Last Admin: 10/31/18 15:03 Dose:  1 applic


Oxcarbazepine (Trileptal)  150 mg PO HS [facility]; Protocol


   Last Admin: 10/30/18 22:03 Dose:  150 mg


Oxcarbazepine (Trileptal)  150 mg PO BID [facility]; Protocol


   Last Admin: 10/31/18 15:03 Dose:  150 mg


Oxycodone/Acetaminophen (Percocet 2.5/325 Mg Tab)  1 tab PO TID PRN


   PRN Reason: severe pain >8/10


   Last Admin: 10/30/18 22:11 Dose:  1 tab


Pantoprazole Sodium (Protonix Ec Tab)  40 mg PO 0600 [facility]


   Last Admin: 10/31/18 06:32 Dose:  40 mg


Risperidone (Risperdal Tab)  1 mg PO AMHS [facility]; Protocol


   Last Admin: 10/31/18 09:29 Dose:  1 mg


Trazodone HCl (Desyrel)  100 mg PO HS [facility]


   Last Admin: 10/30/18 22:03 Dose:  100 mg


Trazodone HCl (Desyrel)  100 mg PO HS PRN


   PRN Reason: as neeeded for insomnia


Ziprasidone (Geodon Inj)  20 mg IM Q6H PRN; Protocol


   PRN Reason: Agitation


Ziprasidone (Geodon Cap)  20 mg PO QID PRN; Protocol


   PRN Reason: agitation/psychosis











Physical Exam





- Constitutional


Appears: Well, Non-toxic, No Acute Distress





- Head Exam


Head Exam: ATRAUMATIC, NORMAL INSPECTION, NORMOCEPHALIC





- Eye Exam


Eye Exam: EOMI


Pupil Exam: PERRL





- Respiratory Exam


Respiratory Exam: Clear to Auscultation Bilateral, NORMAL BREATHING PATTERN





- Cardiovascular Exam


Cardiovascular Exam: REGULAR RHYTHM





- GI/Abdominal Exam


GI & Abdominal Exam: Normal Bowel Sounds, Soft





- Extremities Exam


Additional comments: 


right calf induration, nonerythematous, not warm, tender. Blue, purple 

discoloration present








- Neurological Exam


Neurological exam: Alert, CN II-XII Intact, Oriented x3





- Skin


Skin Exam: Dry, Intact, Normal Color





Results





- Vital Signs


Recent Vital Signs: 


                                Last Vital Signs











Temp  98.5 F   10/31/18 07:26


 


Pulse  102 H  10/31/18 08:04


 


Resp  20   10/31/18 07:26


 


BP  135/85   10/31/18 08:04


 


Pulse Ox  100   10/28/18 19:31














- Labs


Result Diagrams: 


                                 10/30/18 08:00





                                 10/30/18 08:00





Assessment & Plan





- Assessment and Plan (Free Text)


Assessment: 


63 year old male with past medical history of hypertension, CAD, hypothyroidism,

depression, suicidal ideation, and history of dog bite 3 weeks and 2 months ago 

presents at bedside status post dog bite 3 weeks after her last dog bit by her 

dog. Doppler U/S was negative for DVT and V/Q scan was negative for PE.





Plan: 


Low suspicion of abscess at this time.


Continue antibiotics as per ID.


Ultrasound of the right calf to evaluate for pathology of induration.


Please contact surgery if patient's condition worsens.


GI prophylaxis with protonix.





Will discuss case with Dr. Echavarria








- Date & Time


Date: 10/31/18


Time: 15:43

## 2018-11-01 LAB
ALBUMIN SERPL-MCNC: 4.5 G/DL (ref 3–4.8)
ALBUMIN/GLOB SERPL: 1.4 {RATIO} (ref 1.1–1.8)
ALT SERPL-CCNC: 47 U/L (ref 7–56)
AST SERPL-CCNC: 35 U/L (ref 14–36)
BUN SERPL-MCNC: 15 MG/DL (ref 7–21)
CALCIUM SERPL-MCNC: 9.4 MG/DL (ref 8.4–10.5)
ERYTHROCYTE [DISTWIDTH] IN BLOOD BY AUTOMATED COUNT: 14 % (ref 11.5–14.5)
GFR NON-AFRICAN AMERICAN: > 60
HGB BLD-MCNC: 12.7 G/DL (ref 12–16)
MCH RBC QN AUTO: 27.4 PG (ref 25–35)
MCHC RBC AUTO-ENTMCNC: 32.8 G/DL (ref 31–37)
MCV RBC AUTO: 83.6 FL (ref 80–105)
PLATELET # BLD: 219 10^3/UL (ref 120–450)
PMV BLD AUTO: 9.5 FL (ref 7–11)
RBC # BLD AUTO: 4.63 10^6/UL (ref 3.5–6.1)
WBC # BLD AUTO: 4.2 10^3/UL (ref 4.5–11)

## 2018-11-01 RX ADMIN — AMOXICILLIN AND CLAVULANATE POTASSIUM SCH TAB: 500; 125 TABLET, FILM COATED ORAL at 09:42

## 2018-11-01 RX ADMIN — AMOXICILLIN AND CLAVULANATE POTASSIUM SCH TAB: 500; 125 TABLET, FILM COATED ORAL at 16:27

## 2018-11-01 RX ADMIN — MUPIROCIN SCH APPLIC: 20 OINTMENT TOPICAL at 16:27

## 2018-11-01 RX ADMIN — OXYCODONE HYDROCHLORIDE AND ACETAMINOPHEN PRN TAB: 2.5; 325 TABLET ORAL at 23:00

## 2018-11-01 RX ADMIN — MUPIROCIN SCH APPLIC: 20 OINTMENT TOPICAL at 09:49

## 2018-11-01 RX ADMIN — OXYCODONE HYDROCHLORIDE AND ACETAMINOPHEN PRN TAB: 2.5; 325 TABLET ORAL at 18:04

## 2018-11-01 RX ADMIN — OXYCODONE HYDROCHLORIDE AND ACETAMINOPHEN PRN TAB: 2.5; 325 TABLET ORAL at 13:49

## 2018-11-01 RX ADMIN — PANTOPRAZOLE SODIUM SCH MG: 40 TABLET, DELAYED RELEASE ORAL at 06:50

## 2018-11-01 RX ADMIN — OXYCODONE HYDROCHLORIDE AND ACETAMINOPHEN PRN TAB: 2.5; 325 TABLET ORAL at 06:50

## 2018-11-01 NOTE — US
Date of service: 



11/01/2018



PROCEDURE:  LIMITED RIGHT CALF SOFT TISSUE ULTRASOUND



HISTORY:

RLE, r/o abscess



COMPARISON:

None available.



TECHNIQUE:

Using a linear high-frequency transducer, ultrasonography of area of 

trauma to the right calf was interrogated using sagittal as well as 

transverse projections. 



FINDINGS:

Increased echogenicity is appreciate the subcutaneous fat at the 

reported site of an apparent dog bite.  There are a few small foci of 

the sub cm size echolucencies suggestive of tiny potential fluid 

collections or even microabscesses.  Hyperemia is not apparent 

however and further clinical correlation is recommended as this may 

not be infected necessarily and simply be reactive.  The affected 

area measures at least 5.5 x 2.3 cm.



IMPRESSION:

Predominantly reactive change/phlegmon is appreciated at the affected 

subcutaneous soft tissue at the right calf though a few tiny 

microabscesses may be present here.  Involvement appears to be 

contained to the subcutaneous space with the gastrocnemius muscle 

displaced.  Further clinical correlation is advised.  Consider 

follow-up sonography following therapy.

## 2018-11-01 NOTE — PN
DATE:  11/01/2018



SUBJECTIVE:  When I saw her, sitting out of bed, on chair in the

psychiatric unit.  She was seen by Infectious Disease and also by Surgery

to look at the dog bite of the calf and no surgery is needed.  She will

continue with the Augmentin and the Bactroban cream.  She is also on

Cozaar, Desyrel, Geodon, Maalox, Microzide, milk of magnesia, Percocet,

Protonix, Prozac, Risperdal, Synthroid, Trileptal, and Tylenol.



PHYSICAL EXAMINATION:

VITAL SIGNS:  She has a 98.8 temperature, 100 pulse, 134/83 blood pressure,

20 respiratory rate.

HEENT:  Head is atraumatic, normocephalic.

HEART:  Regular rate.

LUNGS:  Clear to auscultation.

ABDOMEN:  Soft, obese, nontender.

EXTREMITIES:  No edema, and she does have healing dog bite marks on the

left calf, which did not look infected to me.  Surgery is not going to do

any procedure on it.



LABORATORY DATA:  She has a 4.2 white count, 12.7 hemoglobin, 30.7

hematocrit with 219 platelets.  Clinically, she is very stable.  She has

140 sodium, potassium 3.9, BUN 15, creatinine 0.9, GFR 60, sugar 97,

calcium 9.4, total bili is 0.2.  AST is 35, ALT is 47, alk phos 72, total

protein is 7.6.  Her TSH is 0.76.  I did back off on his Synthroid the

other day and that came out much better, so I adjusted the Synthroid.  She

is being seen by Infectious Disease, Psychiatry and Surgery, so I will keep

an eye on the calf, adjust the medications for her thyroid medications, she

did better.







__________________________________________

Karel Liao DO





DD:  11/01/2018 11:38:14

DT:  11/01/2018 11:40:21

Job # [id number]

## 2018-11-01 NOTE — PN
DATE:



SUBJECTIVE:  The patient denies any chest pain, palpitation or shortness of

breath.



PHYSICAL EXAMINATION:

GENERAL:  The patient was sitting with her group, comfortable and in good

mood.

VITAL SIGNS:  Blood pressure 134/83, heart rate 100, temperature 98.8,

respirations 20.



LABORATORY DATA:  Today's hemoglobin and hematocrit 12.7 and 38.7, white

count 12.2, platelet count 219,000.  Today's _____ is entirely within

normal limits.  Liver enzymes are within normal limits.  TSH level is

within normal limits at 0.76.  Ventilation perfusion scan official report,

low probability for pulmonary embolus.



ASSESSMENT:

1.  Chest pain, myocardial infarction is ruled out.  The patient has recent

normal Lexiscan.

2.  Right _____ cellulitis related to recent tongue biting.

3.  Hypertension.

4.  Depression.



RECOMMENDATIONS:  Continue current oral Augmentin, Cozaar, Desyrel, Geodon,

hydrochlorothiazide 12.5 mg daily, Synthroid at 88 mcg daily, Trileptal 250

mg once a day.







__________________________________________

Eleno Quiroz MD





DD:  11/01/2018 11:29:24

DT:  11/01/2018 13:16:15

Job # [id number]
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
## 2018-11-01 NOTE — PN
DATE:  11/01/2018



PHYSICAL EXAMINATION:

GENERAL:  On exam, the patient is in bed, no acute distress.

VITAL SIGNS:  Temperature of 98, blood pressure is 130/80, respiratory rate

of 16.

HEENT:  Examination of HEENT is unremarkable.

NECK:  Supple.

LUNGS:  Have decreased breath sounds.

HEART:  Normal S1, S2.

ABDOMEN:  Soft.



LABORATORY DATA:  Laboratory examination reveals the white count is 4.2 and

the hemoglobin of 12.  Chemistries are noted.  Serology is noted.  HIV is

negative.  RPR is negative.  Dr. Echavarria's consultation is reviewed.



ASSESSMENT AND PLAN:  A 63-year-old female with obesity, body mass index of

39, hypertension, high cholesterol, depression, anxiety with right leg dog

bite over 3 weeks ago.  Today, she says her leg is much improved and will

continue the p.o. Augmentin.  Surgical evaluation is noted.  Low suspicion

for abscess is noted.  An ultrasound will be ordered.  We will follow with

you.





__________________________________________

Kvng Blackwood MD





DD:  11/01/2018 13:31:05

DT:  11/01/2018 13:33:11

Job # [id number]

## 2018-11-02 VITALS — TEMPERATURE: 98.4 F | SYSTOLIC BLOOD PRESSURE: 122 MMHG | DIASTOLIC BLOOD PRESSURE: 76 MMHG | HEART RATE: 101 BPM

## 2018-11-02 RX ADMIN — MUPIROCIN SCH APPLIC: 20 OINTMENT TOPICAL at 08:30

## 2018-11-02 RX ADMIN — PANTOPRAZOLE SODIUM SCH MG: 40 TABLET, DELAYED RELEASE ORAL at 08:29

## 2018-11-02 RX ADMIN — AMOXICILLIN AND CLAVULANATE POTASSIUM SCH TAB: 500; 125 TABLET, FILM COATED ORAL at 08:28

## 2018-11-02 NOTE — CP.PCM.PN
Subjective





- Date & Time of Evaluation


Date of Evaluation: 11/02/18


Time of Evaluation: 09:34





- Subjective


Subjective: 





Surgery: Dr. Echavarria 





Discussed further plan of care with attending Dr. Liao. Soft tissue ultrasound

on 11/1 shows possible areas of "microabscess" but no definitely fluid 

collection and clinically patient has shown improvement in the cellulitis. 

Recommend continuing Augmentin and warm compresses to the calf as outpatient. If

induration worsens, fever, severe pain, or foul drainage occurs, patient should 

return to nearest ER for further evaluation. Patient needs strict f/u to monitor

progression of the wound with primary doctor. Patient can f/u with Dr. Echavarria 

in office prn, call office for appointment. 





AKWhite PGY4





Objective





- Vital Signs/Intake and Output


Vital Signs (last 24 hours): 


                                        











Temp Pulse Resp BP Pulse Ox


 


 98.4 F   101 H  20   122/76   100 


 


 11/02/18 07:10  11/02/18 08:29  11/02/18 07:10  11/02/18 08:29  10/28/18 19:31











- Medications


Medications: 


                               Current Medications





Acetaminophen (Tylenol 325mg Tab)  325 mg PO Q6H PRN


   PRN Reason: Pain, Mild (1-3)


   Last Admin: 10/31/18 15:02 Dose:  325 mg


Al Hydrox/Mg Hydrox/Simethicone (Maalox Plus 30 Ml)  30 ml PO DAILY PRN


   PRN Reason: Dyspepsia


   Last Admin: 10/29/18 23:49 Dose:  30 ml


Amoxicillin/Clavulanate Potassium (Augmentin 500 Mg-125 Mg Tab)  1 tab PO BID 

[facility]; Protocol


   Last Admin: 11/02/18 08:28 Dose:  1 tab


Fluoxetine HCl (Prozac)  20 mg PO DAILY [facility]


   Last Admin: 11/02/18 08:30 Dose:  20 mg


Home Med (Home Med)  0 unit PO Q8 PRN


   PRN Reason: Anxiety


   Last Admin: 10/30/18 22:10 Dose:  1 unit


Hydrochlorothiazide (Microzide)  12.5 mg PO DAILY [facility]


   Last Admin: 11/02/18 08:28 Dose:  12.5 mg


Levothyroxine Sodium (Synthroid)  88 mcg PO 0600 [facility]


   Last Admin: 11/02/18 06:22 Dose:  88 mcg


Losartan Potassium (Cozaar)  50 mg PO DAILY [facility]


   Last Admin: 11/02/18 08:29 Dose:  50 mg


Magnesium Hydroxide (Milk Of Magnesia)  30 ml PO DAILY PRN


   PRN Reason: Constipation


Mupirocin (Bactroban Ointment)  0 gm TOP BID [facility]


   Last Admin: 11/02/18 08:30 Dose:  1 applic


Oxcarbazepine (Trileptal)  150 mg PO HS [facility]; Protocol


   Last Admin: 11/01/18 23:01 Dose:  150 mg


Oxcarbazepine (Trileptal)  150 mg PO BID [facility]; Protocol


   Last Admin: 11/02/18 08:30 Dose:  150 mg


Oxycodone/Acetaminophen (Percocet 2.5/325 Mg Tab)  1 tab PO TID PRN


   PRN Reason: severe pain >8/10


   Last Admin: 11/01/18 23:00 Dose:  1 tab


Pantoprazole Sodium (Protonix Ec Tab)  40 mg PO 0600 [facility]


   Last Admin: 11/02/18 08:29 Dose:  40 mg


Risperidone (Risperdal Tab)  1 mg PO AMHS [facility]; Protocol


   Last Admin: 11/01/18 23:00 Dose:  1 mg


Trazodone HCl (Desyrel)  100 mg PO HS [facility]


   Last Admin: 11/01/18 23:01 Dose:  100 mg


Trazodone HCl (Desyrel)  100 mg PO HS PRN


   PRN Reason: as neeeded for insomnia


   Last Admin: 11/02/18 00:48 Dose:  100 mg


Ziprasidone (Geodon Inj)  20 mg IM Q6H PRN; Protocol


   PRN Reason: Agitation


Ziprasidone (Geodon Cap)  20 mg PO QID PRN; Protocol


   PRN Reason: agitation/psychosis











- Labs


Labs: 


                                        





                                 11/01/18 07:30 





                                 11/01/18 07:30

## 2018-11-02 NOTE — PN
DATE:  11/02/2018



SUBJECTIVE:  The patient is in bed, in no acute distress, nontoxic.



PHYSICAL EXAMINATION:

VITAL SIGNS:  On exam, temperature is 98, blood pressure is 120/70,

respiratory rate of 18.

HEENT:  Examination of HEENT is unremarkable.

NECK:  Supple.

LUNGS:  Have decreased breath sounds.

HEART:  Normal S1, S2.

ABDOMEN:  Soft.



LABORATORY DATA:  Laboratory examination reveals a white count of 4.2,

hemoglobin is noted.  Chemistries are reviewed with normal BUN and

creatinine.  Urinalysis is noted.  Toxicology reveals positive for

benzodiazepine and urine opiates are positive.  HIV is negative and RPR is

nonreactive.  The patient had an ultrasound of soft tissue _____ this

morning, she tolerated the procedure well.  Predominantly, reactive change

of phlegmon is appreciated.



ASSESSMENT AND PLAN:  A 63-year-old female, who was seen earlier this

morning in psychiatric [facility].  She is doing much better.  She states her leg

is much improved.  She has a history of obesity with BMI of 39, almost

morbid obesity with hypertension, high cholesterol, depression, anxiety,

right leg infection, status post dog bite.  On p.o. Augmentin, would

complete 7-10 days of p.o. Augmentin.  Follow it clinically.





__________________________________________

Kvng Blackwood MD





DD:  11/02/2018 12:53:09

DT:  11/02/2018 12:56:48

Job # [id number]

## 2018-11-02 NOTE — PCM.PYCHDC
Mental Status Examination





- Mental Status Examination


Orientation: Person, Place, Situation, Time


Memory: Intact


Mood: Neutral


Affect: Broad (and mood congruent)


Speech: Appropriate


Attention: WNL


Concentration: WNL


Association: WNL


Fund of Knowledge: WNL


Formal Thought Process: No Impairment


Description of patient's judgement and insight: 


Pt has improved insight into mental and medical illness, pt was compliant with 

medications and unit rules and regulations, pt was going to groups, was calm, 

cooperative, socially appropriate, no behavioral incidents, no agitation, no 

aggression.








Psychotic Thoughts and Behaviors: 





Pt denied v/a/t hallucinations, denied paranoid ideations, pt does not appear to

be psychotic, and thought process is goal directed. 





Suicidal Ideation: No


Current Homicidal Ideation?: No


Plan: 





pt adamantly denied thoughts of harming self or others denied intent or plan.





Discharge Summary





- Discharge Note


Reason for Hospitalization: 


pt was admitted for evaluation and stabilization of depressive symptoms which 

seems to be worsening, pt tried to jump off the moving vehicle. 


possible suicidal ideation. 


Psychiatric History (includes Medical, Family, Personal Hx): see HPI


Laboratory Data: 














                                 11/01/18 07:30 





                                 11/01/18 07:30 





                                   Lab Results





11/01/18 07:30: TSH 3rd Generation 0.76


11/01/18 07:30: Sodium 140, Potassium 3.9, Chloride 103, Carbon Dioxide 28, 

Anion Gap 13, BUN 15, Creatinine 0.9, Est GFR (African Amer) > 60, Est GFR (Non-

Af Amer) > 60, Random Glucose 97, Calcium 9.4, Total Bilirubin 0.2, AST 35, ALT 

47, Alkaline Phosphatase 72, Total Protein 7.6, Albumin 4.5, Globulin 3.1, 

Albumin/Globulin Ratio 1.4


11/01/18 07:30: WBC 4.2 L, RBC 4.63, Hgb 12.7, Hct 38.7, MCV 83.6, MCH 27.4, 

MCHC 32.8, RDW 14.0, Plt Count 219, MPV 9.5


10/31/18 13:10: HIV 1&2 Ag/Ab, 4th Gen Nonreactive


10/30/18 08:00: TSH 3rd Generation 0.28 L


10/30/18 08:00: Sodium 140, Potassium 3.7, Chloride 106, Carbon Dioxide 23, 

Anion Gap 14, BUN 14, Creatinine 0.8, Est GFR (African Amer) > 60, Est GFR (Non-

Af Amer) > 60, Random Glucose 91, Calcium 9.0, Total Bilirubin 0.3, AST 42 H D, 

ALT 50, Alkaline Phosphatase 63, Total Protein 7.2, Albumin 4.1, Globulin 3.1, 

Albumin/Globulin Ratio 1.3


10/30/18 08:00: WBC 3.2 L D, RBC 4.49, Hgb 12.4, Hct 37.5, MCV 83.5, MCH 27.6, 

MCHC 33.1, RDW 13.9, Plt Count 187, MPV 9.5


10/29/18 14:00: D-Dimer, Quantitative 285 H


10/29/18 07:00: RPR Nonreactive


10/29/18 07:00: Fasting Glucose 93, Triglycerides 121, Cholesterol 157, LDL 

Cholesterol Direct 65, HDL Cholesterol 77 H


10/29/18 07:00: TSH 3rd Generation 0.45 L


10/28/18 22:15: Hemoglobin A1c 5.6


10/28/18 22:15: Triglycerides 157, Cholesterol 162, LDL Cholesterol Direct 61, 

HDL Cholesterol 76 H


10/28/18 19:25: Urine Opiates Screen Positive H, Urine Methadone Screen 

Negative, Ur Barbiturates Screen Negative, Ur Phencyclidine Scrn Negative, Ur 

Amphetamines Screen Negative, U Benzodiazepines Scrn Positive H, U Oth Cocaine 

Metabols Negative, U Cannabinoids Screen Negative


10/28/18 19:25: Urine Color Yellow, Urine Appearance Clear, Urine pH 6.0, Ur 

Specific Gravity 1.020, Urine Protein Negative, Urine Glucose (UA) Negative, 

Urine Ketones Negative, Urine Blood Negative, Urine Nitrate Negative, Urine 

Bilirubin Negative, Urine Urobilinogen 0.2, Ur Leukocyte Esterase Trace H, Urine

RBC Negative, Urine WBC 1 - 3, Ur Epithelial Cells 1 - 3, Urine Bacteria Few


10/28/18 16:45: Alcohol, Quantitative < 10


10/28/18 16:45: Salicylates < 1 L, Acetaminophen < 10.0 L


10/28/18 16:45: Sodium 140, Potassium 3.7, Chloride 104, Carbon Dioxide 26, 

Anion Gap 14, BUN 18, Creatinine 1.0, Est GFR (African Amer) > 60, Est GFR (Non-

Af Amer) 56, Random Glucose 107, Calcium 9.4, Magnesium 1.9, Total Bilirubin 

0.4, AST 58 H, ALT 45, Alkaline Phosphatase 73, Total Protein 7.3, Albumin 4.3, 

Globulin 2.9, Albumin/Globulin Ratio 1.5


10/28/18 16:45: WBC 4.6  D, RBC 4.72, Hgb 13.2, Hct 39.6, MCV 83.9, MCH 28.0, 

MCHC 33.3, RDW 13.8, Plt Count 191, MPV 9.3, Gran % 51.5, Lymph % (Auto) 42.2 H,

Mono % (Auto) 4.6, Eos % (Auto) 1.5, Baso % (Auto) 0.2, Gran # 2.35, Lymph # 

(Auto) 1.9, Mono # (Auto) 0.2, Eos # (Auto) 0.1, Baso # (Auto) 0.01








Vital Signs











  Temp Pulse Resp BP Pulse Ox


 


 11/02/18 08:29   101 H   122/76 


 


 11/02/18 07:10  98.4 F  101 H  20  122/76 


 


 11/01/18 15:00   89   135/79 


 


 11/01/18 09:42   100 H   134/83 


 


 11/01/18 07:24  98.8 F  100 H  20  134/83 


 


 10/31/18 16:00   117 H   128/76 


 


 10/31/18 08:04   102 H   135/85 


 


 10/31/18 07:26  98.5 F  102 H  20  135/85 


 


 10/30/18 09:16   97 H   120/86 


 


 10/30/18 07:24  97.4 F L  97 H  20  120/86 


 


 10/29/18 15:18   101 H   153/93 H 


 


 10/29/18 14:52   66   101/53 L 


 


 10/29/18 07:00  97.7 F  66  19  101/53 L 


 


 10/29/18 04:40    19  


 


 10/28/18 19:31  98.2 F  88  16  128/72  100


 


 10/28/18 17:30   90  18  120/80  99


 


 10/28/18 15:55  97.7 F  104 H  18  116/74  96








USG of LE showed microabsceses


see report





Consultations:: List each consultation separately and include:  1. Reason for r

equest.  2. Findings.  3. Follow-up


Consultations: 





medical consult appreciated please see notes for more detailed information


ID/surgical consults were called, see notes for more detailed information





Surgery: Dr. Echavarria, consult appreciated


Impression:


"Soft tissue ultrasound on 11/1 shows possible areas of "microabscess" but no 

definitely fluid collection and clinically patient has shown improvement in the 

cellulitis."





Recommend:


"continuing Augmentin and warm compresses to the calf as outpatient.


If induration worsens, fever, severe pain, or foul drainage occurs, patient 

should return to nearest ER for further evaluation. 


Patient needs strict f/u to monitor progression of the wound with primary 

doctor. 


Patient can f/u with Dr. Echavarria in office prn, call office for appointment."





discussed with  11/2/18


pt was cleared for d/c





Summary of Hospital Course include:: 1. Description of specific treatment plan 

utilized for patients during their course of treatmen.  2. Summarize the time-

course for resolution of acute symptoms and/or regressed behaviors.  3. Describe

issues identified and worked on during hospitalization.  4. Describe medication 

utilized.  5. Describe medical problems identified and treated.  6. Reassessment

of suicide risk


Summary of Hospital Course: 


shortly pt is 62yo female with h/o bipolar disorder, at least one previous psych

admission to this facility in 2015, pt is following up in the community by 

, pt reported being compliant with meds and f/u appt, multiple 

medical issues, was sent to the psych unit by outpatient psychiatrist for 

evaluation of worsening of depression/ocd/ pt tried to jump off the moving 

vehicle over the weekend. pt required further evaluation and stabilization, meds

adjustment. 





At the time of admission patient presented to be depressed, anxious,  unable to 

function. Please see initial evaluation for more detailed information.














                                 10/28/18 16:45 





                                 10/28/18 16:45 





                                   Lab Results





10/29/18 14:00: D-Dimer, Quantitative 285 H


10/29/18 07:00: Fasting Glucose 93, Triglycerides 121, Cholesterol 157, LDL 

Cholesterol Direct 65, HDL Cholesterol 77 H


10/29/18 07:00: TSH 3rd Generation 0.45 L


10/28/18 22:15: Hemoglobin A1c 5.6


10/28/18 22:15: Triglycerides 157, Cholesterol 162, LDL Cholesterol Direct 61, 

HDL Cholesterol 76 H


10/28/18 19:25: Urine Opiates Screen Positive H, Urine Methadone Screen 

Negative, Ur Barbiturates Screen Negative, Ur Phencyclidine Scrn Negative, Ur 

Amphetamines Screen Negative, U Benzodiazepines Scrn Positive H, U Oth Cocaine 

Metabols Negative, U Cannabinoids Screen Negative


10/28/18 19:25: Urine Color Yellow, Urine Appearance Clear, Urine pH 6.0, Ur 

Specific Gravity 1.020, Urine Protein Negative, Urine Glucose (UA) Negative, 

Urine Ketones Negative, Urine Blood Negative, Urine Nitrate Negative, Urine 

Bilirubin Negative, Urine Urobilinogen 0.2, Ur Leukocyte Esterase Trace H, Urine

RBC Negative, Urine WBC 1 - 3, Ur Epithelial Cells 1 - 3, Urine Bacteria Few


10/28/18 16:45: Alcohol, Quantitative < 10


10/28/18 16:45: Salicylates < 1 L, Acetaminophen < 10.0 L


10/28/18 16:45: Sodium 140, Potassium 3.7, Chloride 104, Carbon Dioxide 26, 

Anion Gap 14, BUN 18, Creatinine 1.0, Est GFR (African Amer) > 60, Est GFR (Non-

Af Amer) 56, Random Glucose 107, Calcium 9.4, Magnesium 1.9, Total Bilirubin 

0.4, AST 58 H, ALT 45, Alkaline Phosphatase 73, Total Protein 7.3, Albumin 4.3, 

Globulin 2.9, Albumin/Globulin Ratio 1.5


10/28/18 16:45: WBC 4.6  D, RBC 4.72, Hgb 13.2, Hct 39.6, MCV 83.9, MCH 28.0, 

MCHC 33.3, RDW 13.8, Plt Count 191, MPV 9.3, Gran % 51.5, Lymph % (Auto) 42.2 H,

Mono % (Auto) 4.6, Eos % (Auto) 1.5, Baso % (Auto) 0.2, Gran # 2.35, Lymph # 

(Auto) 1.9, Mono # (Auto) 0.2, Eos # (Auto) 0.1, Baso # (Auto) 0.01








Vital Signs











  Temp Pulse Resp BP Pulse Ox


 


 10/29/18 07:00  97.7 F  66  19  101/53 L 


 


 10/29/18 04:40    19  


 


 10/28/18 19:31  98.2 F  88  16  128/72  100


 


 10/28/18 17:30   90  18  120/80  99


 


 10/28/18 15:55  97.7 F  104 H  18  116/74  96








over the course of this hospitalization pt was stabilized on the following meds


celexa was d/c


prozac was started and slowly titrated to 20mg po dialy for depression and 

anxiety


Xanax patient did not ask for it and it was discontinued, no withdrawal symptoms


Risperdal was continued 1 mg twice a day, but that the day of discharge medical 

record indicated that patient was on 2 mg twice a day and it was resumed for m

ood stabilization


Trileptal 150 mg 3 times a day for mood stabilization


Trazodone 100-200 mg at the nighttime for depression and insomnia


Discussed with outpatient psychiatrist Dr. Vang on10/29/2018


Patient tolerated medications well, no side effects observed or reported, aims 

0, no EPS.





Patient was seen by medical team, infectious diseases team, surgical team please

see notes for more detailed information.


as per medical team patient needs to continue antibiotics for another 5 days for

infection of her lower extremity status post dog bite.





Over the course of this hospitalization pt was attending groups, pt also had 

medication management, had therapeutic milieu. 





Overall pt improved significantly, pt's affect became brighter, pt was less 

depressed, has realistic future oriented plans, pt also does not appear to be 

psychotic, or anxious, pt was socially appropriate, no behavioral issues, pts 

insight improved as well and soon pt deemed to be ready for discharge. 





At the time of the discharge pt denied been depressed, denied thoughts of 

harming self or others, denied psychotic symptoms, and pt does not appeared to 

be psychotic, denied been anxious, pt is not in  imminent danger to self or 

others, pt was referred back to her outpatient psychiatrist , informa

tion about follow up appointment, time and address provided to the pt (see SW 

note), it is patient responsibility to follow up with outpatient clinic, 

PMD/surgical/infectious disease specialist all provided contact info and pt is 

aware of a need to be f/u aw outpatient. 





In case pt will need to obtain results of studies pending at discharge pt was 

provided with contact information of Psychiatric Inpatient unit [phone] as

well as Medical Record Department [phone].


patient does not smoke,does not use drugs, denied drinking alcohol


pt was provided with prescriptions for all of medications (please see medication

reconciliation form)


Pt was educated about safety plan in case of worsening of  symptoms or in case 

of suicidal or homicidal ideation call 911 or go to the nearest ER, also was 

educated to take meds as prescribed and stay away from drugs, pt verbalized 

understanding.








- Diagnosis


(1) Bipolar disorder


Current Visit: Yes   Status: Chronic   Priority: High   





(2) History of OCD (obsessive compulsive disorder)


Current Visit: Yes   Status: Chronic   Priority: Medium   





- Final Diagnosis (DSM 5)


Condition upon Discharge: GOOD


Disposition: HOME/ ROUTINE


Follow-up Treatment Plan: 


At the time of the discharge pt denied been depressed, denied thoughts of 

harming self or others, denied psychotic symptoms, and pt does not appeared to 

be psychotic, denied been anxious, pt is not in  imminent danger to self or 

others, pt was referred back to her outpatient psychiatrist , info

rmation about follow up appointment, time and address provided to the pt (see SW

note), it is patient responsibility to follow up with outpatient clinic, 

PMD/surgical/infectious disease specialist all provided contact info and pt is 

aware of a need to be f/u aw outpatient. 





In case pt will need to obtain results of studies pending at discharge pt was 

provided with contact information of Psychiatric Inpatient unit [phone] as

well as Medical Record Department [phone].


patient does not smoke,does not use drugs, denied drinking alcohol


pt was provided with prescriptions for all of medications (please see medication

reconciliation form)


Pt was educated about safety plan in case of worsening of  symptoms or in case 

of suicidal or homicidal ideation call 911 or go to the nearest ER, also was 

educated to take meds as prescribed and stay away from drugs, pt verbalized 

understanding.





Prescriptions/Medication Reconciliation: 


Amoxicillin/Clavulanate [Augmentin 500 MG-125 MG Tab] 1 tab PO BID #10 tab


FLUoxetine [Prozac] 20 mg PO DAILY #14 cap


hydroCHLOROthiazide [Microzide] 12.5 mg PO DAILY #7 cap


Levothyroxine [Synthroid] 88 mcg PO 0600 #7 tab


Losartan [Cozaar] 50 mg PO DAILY #7 tab


Mupirocin 2% Ointment [Bactroban Ointment] 1 unit TOP BID #1 tube


OXcarbazepine [Trileptal] 150 mg PO TID #45 tab


Pantoprazole [Protonix EC Tab] 40 mg PO 0600 #7 ect


Risperidone [Risperdal] 2 mg PO AMHS #30 tablet


traZODone [Desyrel] 100 mg PO HS PRN #30 tab


 PRN Reason: depression





- Smoking Cessation


Smoking Cessation Medication prescribed: No


Reason for not providing: patient denied smoking





- Antipsychotic Medications


Pt discharged on 2 or more routine antipsychotic medications: No

## 2018-11-02 NOTE — PN
DATE:



SUBJECTIVE:  I saw her in the psychiatric unit.  There is a question

about her left calf where she had a dog bite.  She was seen by Surgery

and she also had an ultrasound of the calf, which showed some

microabscesses possibly.  I discussed with the surgical resident, we

discussed with Dr. Echavarria and the plan is going to be she could be

discharged whenever Psychiatry wants to discharge her.  She will

continue with the Augmentin 500 mg twice a day and the Bactroban cream

twice a day, and if it gets worse, she  go to the emergency room

and I will reevaluate it.



MEDICATION:  She is currently on Augmentin, Bactroban cream, Cozaar

for tight blood pressure, Desyrel, Geodon, Microzide, milk of

magnesia, Percocet, Protonix, Prozac, Risperdal, Synthroid, Trileptal,

Tylenol.



PHYSICAL EXAMINATION:

VITAL SIGNS:  Temperature 98.4, 101 pulse, 122/76 blood pressure, 20

respiratory rate.

HEENT:  Head is atraumatic, normocephalic.

HEART:  Regular rate.

LUNGS:  Clear to auscultation.

ABDOMEN:  Soft, nontender.  Positive bowel sounds.

EXTREMITIES:  No edema.  Left calf looks like it is a healing dog

bite, does not look terrible right now.



LABORATORY DATA:  She has a 4.2 white count, 12.7 hemoglobin, 38.2

hematocrit with 219 platelets.  Her white count has been basically

flat.  She has a 140 sodium, potassium 3.9, BUN is 15, creatinine 0.9,

GFR is greater than 60, sugar is 97, calcium is 9.4, total bili is

0.2, AST is 35, ALT is 47, alk phos 72, total protein is 7.6 and 0.76

for TSH since they adjusted the thyroid medication.



ASSESSMENT AND PLAN:  As per Psychiatry, we will continue with

aggressive treatment and care.  Besides her depression, she has

hypertension and dog bite over the left calf.  She is to follow up on

the outpatient.







__________________________________________

Karel Liao DO





DD:  11/02/2018 9:39:05

DT:  11/02/2018 11:53:26

Job # [id number]

[facility]

## 2019-06-03 ENCOUNTER — HOSPITAL ENCOUNTER (EMERGENCY)
Dept: HOSPITAL 42 - ED | Age: 64
Discharge: HOME | End: 2019-06-03
Payer: MEDICARE

## 2019-06-03 VITALS
RESPIRATION RATE: 16 BRPM | DIASTOLIC BLOOD PRESSURE: 80 MMHG | SYSTOLIC BLOOD PRESSURE: 123 MMHG | OXYGEN SATURATION: 98 %

## 2019-06-03 VITALS — BODY MASS INDEX: 40.1 KG/M2

## 2019-06-03 VITALS — TEMPERATURE: 98 F

## 2019-06-03 VITALS — HEART RATE: 61 BPM

## 2019-06-03 DIAGNOSIS — I10: Primary | ICD-10-CM

## 2019-06-03 DIAGNOSIS — R42: ICD-10-CM

## 2019-06-03 DIAGNOSIS — Z87.891: ICD-10-CM

## 2019-06-03 LAB
ALBUMIN SERPL-MCNC: 4.2 G/DL (ref 3–4.8)
ALBUMIN/GLOB SERPL: 1.4 {RATIO} (ref 1.1–1.8)
ALT SERPL-CCNC: 40 U/L (ref 7–56)
AST SERPL-CCNC: 30 U/L (ref 14–36)
BASOPHILS # BLD AUTO: 0.01 K/MM3 (ref 0–2)
BASOPHILS NFR BLD: 0.3 % (ref 0–3)
BUN SERPL-MCNC: 13 MG/DL (ref 7–21)
CALCIUM SERPL-MCNC: 9.6 MG/DL (ref 8.4–10.5)
EOSINOPHIL # BLD: 0.1 10*3/UL (ref 0–0.7)
EOSINOPHIL NFR BLD: 2.7 % (ref 1.5–5)
ERYTHROCYTE [DISTWIDTH] IN BLOOD BY AUTOMATED COUNT: 14.1 % (ref 11.5–14.5)
GFR NON-AFRICAN AMERICAN: > 60
HGB BLD-MCNC: 13 G/DL (ref 12–16)
LYMPHOCYTES # BLD: 1.5 10*3/UL (ref 1.2–3.4)
LYMPHOCYTES NFR BLD AUTO: 45.5 % (ref 22–35)
MCH RBC QN AUTO: 26.5 PG (ref 25–35)
MCHC RBC AUTO-ENTMCNC: 32.8 G/DL (ref 31–37)
MCV RBC AUTO: 80.8 FL (ref 80–105)
MONOCYTES # BLD AUTO: 0.2 10*3/UL (ref 0.1–0.6)
MONOCYTES NFR BLD: 4.8 % (ref 1–6)
PLATELET # BLD: 233 10^3/UL (ref 120–450)
PMV BLD AUTO: 9.8 FL (ref 7–11)
RBC # BLD AUTO: 4.9 10^6/UL (ref 3.5–6.1)
TROPONIN I SERPL-MCNC: < 0.01 NG/ML
WBC # BLD AUTO: 3.3 10^3/UL (ref 4.5–11)

## 2019-06-03 PROCEDURE — 83735 ASSAY OF MAGNESIUM: CPT

## 2019-06-03 PROCEDURE — 83615 LACTATE (LD) (LDH) ENZYME: CPT

## 2019-06-03 PROCEDURE — 82550 ASSAY OF CK (CPK): CPT

## 2019-06-03 PROCEDURE — 93005 ELECTROCARDIOGRAM TRACING: CPT

## 2019-06-03 PROCEDURE — 96374 THER/PROPH/DIAG INJ IV PUSH: CPT

## 2019-06-03 PROCEDURE — 99285 EMERGENCY DEPT VISIT HI MDM: CPT

## 2019-06-03 PROCEDURE — 80053 COMPREHEN METABOLIC PANEL: CPT

## 2019-06-03 PROCEDURE — 84484 ASSAY OF TROPONIN QUANT: CPT

## 2019-06-03 PROCEDURE — 85730 THROMBOPLASTIN TIME PARTIAL: CPT

## 2019-06-03 PROCEDURE — 96361 HYDRATE IV INFUSION ADD-ON: CPT

## 2019-06-03 PROCEDURE — 85025 COMPLETE CBC W/AUTO DIFF WBC: CPT

## 2019-06-03 PROCEDURE — 70450 CT HEAD/BRAIN W/O DYE: CPT

## 2019-06-03 NOTE — CT
Date of service: 



06/03/2019



PROCEDURE:  CT HEAD WITHOUT CONTRAST.



HISTORY:

dizzy/vertigo



COMPARISON:

None available.



TECHNIQUE:

Axial computed tomography images were obtained through the head/brain 

without intravenous contrast.  



Radiation dose:



Total exam DLP = 882.86 mGy-cm.



This CT exam was performed using one or more of the following dose 

reduction techniques: Automated exposure control, adjustment of the 

mA and/or kV according to patient size, and/or use of iterative 

reconstruction technique.



FINDINGS:



HEMORRHAGE:

No intracranial hemorrhage. 



BRAIN:

There is metallic density in the left sella turcica with extensive 

streak artifacts in the middle and posterior cranial fossae.  There 

is no mass, mass effect or abnormal extra-axial fluid collection.  

There is no territorial infarction. The midline sagittal structures 

are normal.



VENTRICLES:

The ventricles are normal in size, shape and configuration.



CALVARIUM:





There is no calvarial fracture or extracranial soft tissue swelling.



PARANASAL SINUSES:

Predominantly clear.



MASTOID AIR CELLS:

Predominantly clear.



OTHER FINDINGS:

None.



IMPRESSION:

No acute intracranial abnormality.

## 2019-06-03 NOTE — CARD
--------------- APPROVED REPORT --------------





Date of service: 06/03/2019



EKG Measurement

Heart [id number]

[id number]

[id number]

[id number]

[id number]



<Conclusion>

Sinus bradycardia

Low voltage QRS

Borderline ECG

## 2019-06-03 NOTE — ED PDOC
Arrival/HPI





- General


Chief Complaint: High Blood Pressure


Time Seen by Provider: 06/03/19 12:22


Historian: Patient





- History of Present Illness


Narrative History of Present Illness (Text): 





06/03/19 12:53


Patient is a 63 year old female whose past medical history includes 

hypertension, who presents to the ED complaining of dizziness which occurred 

earlier today. Patient reports that she felt fine yesterday, but after waking up

today she felt dizzy upon getting out of bed, which she describes as a spinning 

sensation. She admits to experiencing some nausea, and slight generalized 

headache. Patient has a history of hypertension, but doesn't take 

antihypertensive medications anymore. She thought her symptoms was related to 

her blood pressure, and so subsequently measured and found her blood pressure to

be 155/101. Patient quit smoking 14 years ago.  Patient denies any fevers, 

numbness, tingling, chest pain, palpitations, shortness of breath, vomiting or 

any other complaint. 





Time/Duration: 1-3 hours


Symptom Onset: Sudden


Symptom Course: Unchanged


Activities at Onset: Light


Context: Standing, Home


Associated Symptoms (Text): 





06/03/19 13:44


Patient woke up this morning with vertigo and a slight headache.  No numbness 

tingling or paresthesias.  No weakness.  Mild nausea but no vomiting.  No chest 

pain palpitations or dyspnea.  She believes that this is related to her blood 

pressure.





Past Medical History





- Provider Review


Nursing Documentation Reviewed: Yes





- Infectious Disease


Hx of Infectious Diseases: None





- Cardiac


Hx Cardiac Disorders: Yes


Hx Hypertension: Yes





- Pulmonary


Hx Respiratory Disorders: No


Hx Tuberculosis: No





- Neurological


Hx Neurological Disorder: No


Hx Seizures: No





- HEENT


Hx HEENT Disorder: No





- Renal


Hx Renal Disorder: No





- Endocrine/Metabolic


Hx Endocrine Disorders: No





- Hematological/Oncological


Hx Blood Disorders: No





- Integumentary


Hx Dermatological Disorder: No





- Musculoskeletal/Rheumatological


Hx Musculoskeletal Disorders: Yes


Hx Herniated Disk: Yes





- Gastrointestinal


Hx Gastrointestinal Disorders: No





- Genitourinary/Gynecological


Hx Genitourinary Disorders: No


Hx Sexually Transmitted Diseases: No





- Psychiatric


Hx Psychophysiologic Disorder: Yes


Hx Anxiety: Yes


Hx Bipolar Disorder: Yes


Hx Depression: Yes


Hx Substance Use: No





- Surgical History


Other/Comment: Gastric lap band 2016.  Cardiac Catherization 2016 [facility]





- Anesthesia


Hx Anesthesia: Yes


Hx Anesthesia Reactions: No


Hx Malignant Hyperthermia: No





- Suicidal Assessment


Feels Threatened In Home Enviroment: No





Family/Social History





- Physician Review


Nursing Documentation Reviewed: Yes


Family/Social History: No Known Family HX, Unknown Family HX


Smoking Status: Former Smoker (Quit smoking 14 years ago)


Hx Alcohol Use: No


Hx Substance Use: No





Allergies/Home Meds


Allergies/Adverse Reactions: 


Allergies





adhesive tape Allergy (Verified 06/03/19 12:16)


   RASH


Iodine and Iodide Containing Produc Allergy (Verified 06/03/19 12:16)


   ANAPHYLAXIS


levothyroxine sodium [From Synthroid] Allergy (Verified 06/03/19 12:16)


   REDNESS


povidone-iodine [From Betadine] Allergy (Verified 06/03/19 12:16)


   RASH


soap [From Betadine] Allergy (Verified 06/03/19 12:16)


   RASH


CRABS Allergy (Severe, Uncoded 06/03/19 12:16)


   ANAPHYLAXIS


fresh fruit Allergy (Intermediate, Uncoded 06/03/19 12:16)


   ITCHING








Home Medications: 


                                    Home Meds











 Medication  Instructions  Recorded  Confirmed


 


Conjugated Estrogens [Premarin] 0.625 mg PO DAILY 06/25/16 10/28/18


 


Liothyronine [Cytomel] 25 mcg PO DAILY 06/25/16 10/28/18














Review of Systems





- Physician Review


All systems were reviewed & negative as marked: Yes





- Review of Systems


Constitutional: absent: Fatigue, Fevers


Respiratory: absent: SOB, Cough, Wheezing


Cardiovascular: absent: Chest Pain, Palpitations, Syncope


Gastrointestinal: Nausea.  absent: Abdominal Pain, Diarrhea, Vomiting


Neurological: Headache, Dizziness.  absent: Focal Weakness, Gait Changes, Speech

 Changes, Facial Droop, Disequilibrium, Seizure





Physical Exam


Vital Signs Reviewed: Yes





Vital Signs











  Temp Pulse Resp BP Pulse Ox


 


 06/03/19 12:16  98.0 F  89  18  146/83  95











Temperature: Afebrile


Blood Pressure: Hypertensive


Pulse: Regular


Respiratory Rate: Normal


Appearance: Positive for: Well-Appearing


Mental Status: Positive for: Alert and Oriented X 3





- Systems Exam


Head: Present: Atraumatic, Normocephalic


Pupils: Present: PERRL


Extroacular Muscles: Present: EOMI


Conjunctiva: Present: Normal


Ears: Present: NORMAL TM, Normal Canal.  No: Erythema, TM Bulging


Mouth: Present: Moist Mucous Membranes


Pharnyx: No: ERYTHEMA, EXUDATE, TONSILS ENLARGED


Neck: Present: Normal Range of Motion


Respiratory/Chest: Present: Clear to Auscultation, Good Air Exchange.  No: 

Respiratory Distress, Accessory Muscle Use


Cardiovascular: Present: Regular Rate and Rhythm, Normal S1, S2.  No: Murmurs


Abdomen: Present: Other (Obese).  No: Tenderness, Distention, Peritoneal Signs, 

Rebound, Guarding


Back: Present: Normal Inspection


Upper Extremity: Present: Normal Inspection.  No: Cyanosis, Edema


Lower Extremity: Present: Normal Inspection.  No: Edema


Neurological: Present: GCS=15, CN II-XII Intact, Speech Normal, Motor Func 

Grossly Intact, Normal Sensory Function, Normal Cerebellar Funct, Gait Normal


Skin: Present: Warm, Dry, Normal Color.  No: Rashes


Psychiatric: Present: Alert, Oriented x 3, Normal Insight, Normal Concentration





Medical Decision Making


ED Course and Treatment: 





06/03/19 13:05


Impression:


63 year old female who is complaining of dizziness that occurred earlier today 

when getting out of bed.








Plan:


-- Head CT without contrast


-- EKG


-- Blood work


-- Labs


-- Cardiac enzyme


-- Reassess and disposition





Prior Visits:


Notes and results from previous visits were reviewed. 





Progress Notes:


 


06/03/19 14:20


EKG shows normal sinus rhythm rate approximately 60 with no acute ST or T wave 

changes.





06/03/19 15:21


Symptoms improved.  Patient will be discharged home accompanied by her daughter 

to follow-up with PMD.  Prescription for Antivert.  Follow-up in ER as needed.  

Hypertension check with PMD.








- Lab Interpretations


I have reviewed the lab results: Yes





- RAD Interpretation


Narrative RAD Interpretations (Text): 





06/03/19 15:26


Head CT without contrast:


Dictator : Maci Lizama MD


FINDINGS:


HEMORRHAGE:No intracranial hemorrhage. 


BRAIN: There is metallic density in the left sella turcica with extensive streak

 artifacts in the middle and posterior cranial fossae.  There is no mass, mass 

effect or abnormal extra-axial fluid collection.  There is no territorial 

infarction. The midline sagittal structures are normal.


VENTRICLES: The ventricles are normal in size, shape and configuration.


CALVARIUM: There is no calvarial fracture or extracranial soft tissue swelling.


PARANASAL SINUSES: Predominantly clear.


MASTOID AIR CELLS: Predominantly clear.


OTHER FINDINGS: None.


IMPRESSION:


No acute intracranial abnormality.





Radiology Orders: 





CT scan of the head is read by the radiologist is unremarkable.


: Radiologist





- EKG Interpretation


Interpreted by ED Physician: Yes


Type: 12 lead EKG





- Scribe Statement


The provider has reviewed the documentation as recorded by the Scribe


Jhonatan Diaz


Provider Scribe Attestation:


All medical record entries made by the Scribe were at my direction and 

personally dictated by me. I have reviewed the chart and agree that the record 

accurately reflects my personal performance of the history, physical exam, 

medical decision making, and the department course for this patient. I have also

 personally directed, reviewed, and agree with the discharge instructions and 

disposition. 





Disposition/Present on Arrival





- Present on Arrival


Any Indicators Present on Arrival: No


History of DVT/PE: No


History of Uncontrolled Diabetes: No


Urinary Catheter: No


History of Decub. Ulcer: Yes


History Surgical Site Infection Following: None





- Disposition


Have Diagnosis and Disposition been Completed?: Yes


Diagnosis: 


 Hypertension, Vertigo





Disposition: HOME/ ROUTINE


Disposition Time: 15:22


Patient Plan: Discharge


Condition: IMPROVED


Discharge Instructions (ExitCare):  Vertigo (a Type of Dizziness), High Blood 

Pressure in Adults


Additional Instructions: 


Hypertension check with PMD.  Follow-up in ER as needed.


Prescriptions: 


Meclizine [Meclizine*] 25 mg PO Q6 #20 tab


Ondansetron ODT [Zofran ODT] 4 mg PO Q6 #20 odt


Referrals: 


PCP,NO [Primary Care Provider] - Follow up with primary


Forms:  [facility] (English)

## 2024-06-11 ENCOUNTER — NEW PATIENT (OUTPATIENT)
Dept: URBAN - METROPOLITAN AREA CLINIC 73 | Facility: CLINIC | Age: 69
End: 2024-06-11

## 2024-06-11 DIAGNOSIS — H35.463: ICD-10-CM

## 2024-06-11 DIAGNOSIS — H31.103: ICD-10-CM

## 2024-06-11 DIAGNOSIS — Z79.899: ICD-10-CM

## 2024-06-11 DIAGNOSIS — H43.393: ICD-10-CM

## 2024-06-11 PROCEDURE — 92250 FUNDUS PHOTOGRAPHY W/I&R: CPT

## 2024-06-11 PROCEDURE — 92201 OPSCPY EXTND RTA DRAW UNI/BI: CPT | Mod: NC

## 2024-06-11 PROCEDURE — 99204 OFFICE O/P NEW MOD 45 MIN: CPT

## 2024-06-11 PROCEDURE — 92134 CPTRZ OPH DX IMG PST SGM RTA: CPT | Mod: NC

## 2024-06-11 ASSESSMENT — VISUAL ACUITY
OS_CC: 20/50
OD_CC: 20/25

## 2024-06-11 ASSESSMENT — TONOMETRY
OD_IOP_MMHG: 12
OS_IOP_MMHG: 12

## 2025-07-17 ENCOUNTER — FOLLOW UP (OUTPATIENT)
Dept: URBAN - METROPOLITAN AREA CLINIC 73 | Age: 70
End: 2025-07-17

## 2025-07-17 VITALS — WEIGHT: 224 LBS | HEIGHT: 55 IN | BODY MASS INDEX: 51.84 KG/M2

## 2025-07-17 DIAGNOSIS — H35.033: ICD-10-CM

## 2025-07-17 DIAGNOSIS — H31.103: ICD-10-CM

## 2025-07-17 DIAGNOSIS — M32.19: ICD-10-CM

## 2025-07-17 DIAGNOSIS — Z79.899: ICD-10-CM

## 2025-07-17 DIAGNOSIS — H35.463: ICD-10-CM

## 2025-07-17 DIAGNOSIS — E11.9: ICD-10-CM

## 2025-07-17 DIAGNOSIS — H43.393: ICD-10-CM

## 2025-07-17 PROCEDURE — 92014 COMPRE OPH EXAM EST PT 1/>: CPT

## 2025-07-17 PROCEDURE — 92202 OPSCPY EXTND ON/MAC DRAW: CPT | Mod: NC

## 2025-07-17 PROCEDURE — 92134 CPTRZ OPH DX IMG PST SGM RTA: CPT | Mod: NC

## 2025-07-17 PROCEDURE — 92250 FUNDUS PHOTOGRAPHY W/I&R: CPT

## 2025-07-17 ASSESSMENT — VISUAL ACUITY
OD_CC: 20/40
OS_CC: 20/25

## 2025-07-17 ASSESSMENT — TONOMETRY
OD_IOP_MMHG: 13
OS_IOP_MMHG: 15